# Patient Record
Sex: MALE | Race: WHITE | Employment: UNEMPLOYED | URBAN - NONMETROPOLITAN AREA
[De-identification: names, ages, dates, MRNs, and addresses within clinical notes are randomized per-mention and may not be internally consistent; named-entity substitution may affect disease eponyms.]

---

## 2018-04-30 ENCOUNTER — HOSPITAL ENCOUNTER (OUTPATIENT)
Dept: GENERAL RADIOLOGY | Age: 53
Discharge: HOME | End: 2018-04-30
Payer: COMMERCIAL

## 2018-04-30 ENCOUNTER — HOSPITAL ENCOUNTER (OUTPATIENT)
Age: 53
Discharge: HOME | End: 2018-04-30
Payer: COMMERCIAL

## 2018-04-30 DIAGNOSIS — R52 PAIN: ICD-10-CM

## 2018-04-30 PROCEDURE — 73140 X-RAY EXAM OF FINGER(S): CPT

## 2019-02-01 ENCOUNTER — HOSPITAL ENCOUNTER (OUTPATIENT)
Dept: MRI IMAGING | Age: 54
Discharge: HOME | End: 2019-02-01
Payer: COMMERCIAL

## 2019-02-01 DIAGNOSIS — M25.562 ACUTE PAIN OF LEFT KNEE: ICD-10-CM

## 2019-02-01 PROCEDURE — 73721 MRI JNT OF LWR EXTRE W/O DYE: CPT

## 2019-02-05 ENCOUNTER — HOSPITAL ENCOUNTER (OUTPATIENT)
Dept: GENERAL RADIOLOGY | Age: 54
Discharge: HOME | End: 2019-02-05
Payer: COMMERCIAL

## 2019-02-05 ENCOUNTER — HOSPITAL ENCOUNTER (OUTPATIENT)
Age: 54
Discharge: HOME | End: 2019-02-05
Payer: COMMERCIAL

## 2019-02-05 DIAGNOSIS — Z01.818 PREOP EXAMINATION: ICD-10-CM

## 2019-02-05 DIAGNOSIS — M25.562 LEFT KNEE PAIN, UNSPECIFIED CHRONICITY: ICD-10-CM

## 2019-02-05 LAB
ANION GAP SERPL CALCULATED.3IONS-SCNC: 14 MEQ/L (ref 8–16)
BUN BLDV-MCNC: 19 MG/DL (ref 7–22)
CALCIUM SERPL-MCNC: 9.6 MG/DL (ref 8.5–10.5)
CHLORIDE BLD-SCNC: 101 MEQ/L (ref 98–111)
CO2: 23 MEQ/L (ref 23–33)
CREAT SERPL-MCNC: 1.1 MG/DL (ref 0.4–1.2)
EKG ATRIAL RATE: 63 BPM
EKG P AXIS: 28 DEGREES
EKG P-R INTERVAL: 178 MS
EKG Q-T INTERVAL: 424 MS
EKG QRS DURATION: 102 MS
EKG QTC CALCULATION (BAZETT): 433 MS
EKG R AXIS: -12 DEGREES
EKG T AXIS: 37 DEGREES
EKG VENTRICULAR RATE: 63 BPM
ERYTHROCYTE [DISTWIDTH] IN BLOOD BY AUTOMATED COUNT: 13.8 % (ref 11.5–14.5)
ERYTHROCYTE [DISTWIDTH] IN BLOOD BY AUTOMATED COUNT: 47 FL (ref 35–45)
GFR SERPL CREATININE-BSD FRML MDRD: 70 ML/MIN/1.73M2
GLUCOSE BLD-MCNC: 96 MG/DL (ref 70–108)
HCT VFR BLD CALC: 42.8 % (ref 42–52)
HEMOGLOBIN: 13.9 GM/DL (ref 14–18)
MCH RBC QN AUTO: 30.4 PG (ref 26–33)
MCHC RBC AUTO-ENTMCNC: 32.5 GM/DL (ref 32.2–35.5)
MCV RBC AUTO: 93.7 FL (ref 80–94)
PLATELET # BLD: 242 THOU/MM3 (ref 130–400)
PMV BLD AUTO: 10.3 FL (ref 9.4–12.4)
POTASSIUM SERPL-SCNC: 4.3 MEQ/L (ref 3.5–5.2)
RBC # BLD: 4.57 MILL/MM3 (ref 4.7–6.1)
SODIUM BLD-SCNC: 138 MEQ/L (ref 135–145)
WBC # BLD: 7.2 THOU/MM3 (ref 4.8–10.8)

## 2019-02-05 PROCEDURE — 36415 COLL VENOUS BLD VENIPUNCTURE: CPT

## 2019-02-05 PROCEDURE — 93005 ELECTROCARDIOGRAM TRACING: CPT | Performed by: ORTHOPAEDIC SURGERY

## 2019-02-05 PROCEDURE — 85027 COMPLETE CBC AUTOMATED: CPT

## 2019-02-05 PROCEDURE — 80048 BASIC METABOLIC PNL TOTAL CA: CPT

## 2019-02-05 PROCEDURE — 71046 X-RAY EXAM CHEST 2 VIEWS: CPT

## 2019-02-06 PROCEDURE — 93010 ELECTROCARDIOGRAM REPORT: CPT | Performed by: INTERNAL MEDICINE

## 2019-02-15 RX ORDER — EZETIMIBE 10 MG/1
10 TABLET ORAL EVERY MORNING
COMMUNITY
End: 2021-10-06 | Stop reason: SDUPTHER

## 2019-02-15 RX ORDER — LISINOPRIL 10 MG/1
10 TABLET ORAL EVERY MORNING
COMMUNITY
End: 2021-10-06 | Stop reason: SDUPTHER

## 2019-02-20 ENCOUNTER — ANESTHESIA EVENT (OUTPATIENT)
Dept: OPERATING ROOM | Age: 54
End: 2019-02-20
Payer: COMMERCIAL

## 2019-02-21 ENCOUNTER — HOSPITAL ENCOUNTER (OUTPATIENT)
Age: 54
Setting detail: OUTPATIENT SURGERY
Discharge: HOME | End: 2019-02-21
Attending: ORTHOPAEDIC SURGERY | Admitting: ORTHOPAEDIC SURGERY
Payer: COMMERCIAL

## 2019-02-21 ENCOUNTER — ANESTHESIA (OUTPATIENT)
Dept: OPERATING ROOM | Age: 54
End: 2019-02-21
Payer: COMMERCIAL

## 2019-02-21 VITALS
TEMPERATURE: 98.6 F | DIASTOLIC BLOOD PRESSURE: 57 MMHG | SYSTOLIC BLOOD PRESSURE: 112 MMHG | RESPIRATION RATE: 6 BRPM | OXYGEN SATURATION: 93 %

## 2019-02-21 VITALS
HEART RATE: 53 BPM | SYSTOLIC BLOOD PRESSURE: 121 MMHG | RESPIRATION RATE: 18 BRPM | BODY MASS INDEX: 40.43 KG/M2 | DIASTOLIC BLOOD PRESSURE: 60 MMHG | OXYGEN SATURATION: 95 % | WEIGHT: 315 LBS | HEIGHT: 74 IN | TEMPERATURE: 97 F

## 2019-02-21 DIAGNOSIS — Z98.890 STATUS POST INCISION AND DRAINAGE: Primary | ICD-10-CM

## 2019-02-21 DIAGNOSIS — S83.232A COMPLEX TEAR OF MEDIAL MENISCUS OF LEFT KNEE AS CURRENT INJURY, INITIAL ENCOUNTER: ICD-10-CM

## 2019-02-21 PROCEDURE — 2580000003 HC RX 258: Performed by: NURSE ANESTHETIST, CERTIFIED REGISTERED

## 2019-02-21 PROCEDURE — 2500000003 HC RX 250 WO HCPCS: Performed by: NURSE ANESTHETIST, CERTIFIED REGISTERED

## 2019-02-21 PROCEDURE — 6360000002 HC RX W HCPCS: Performed by: NURSE ANESTHETIST, CERTIFIED REGISTERED

## 2019-02-21 PROCEDURE — 3700000001 HC ADD 15 MINUTES (ANESTHESIA): Performed by: ORTHOPAEDIC SURGERY

## 2019-02-21 PROCEDURE — 6360000002 HC RX W HCPCS: Performed by: ORTHOPAEDIC SURGERY

## 2019-02-21 PROCEDURE — 3600000013 HC SURGERY LEVEL 3 ADDTL 15MIN: Performed by: ORTHOPAEDIC SURGERY

## 2019-02-21 PROCEDURE — 7100000010 HC PHASE II RECOVERY - FIRST 15 MIN: Performed by: ORTHOPAEDIC SURGERY

## 2019-02-21 PROCEDURE — 2580000003 HC RX 258: Performed by: ORTHOPAEDIC SURGERY

## 2019-02-21 PROCEDURE — 7100000011 HC PHASE II RECOVERY - ADDTL 15 MIN: Performed by: ORTHOPAEDIC SURGERY

## 2019-02-21 PROCEDURE — 6370000000 HC RX 637 (ALT 250 FOR IP): Performed by: NURSE PRACTITIONER

## 2019-02-21 PROCEDURE — 3600000003 HC SURGERY LEVEL 3 BASE: Performed by: ORTHOPAEDIC SURGERY

## 2019-02-21 PROCEDURE — 7100000001 HC PACU RECOVERY - ADDTL 15 MIN: Performed by: ORTHOPAEDIC SURGERY

## 2019-02-21 PROCEDURE — 7100000000 HC PACU RECOVERY - FIRST 15 MIN: Performed by: ORTHOPAEDIC SURGERY

## 2019-02-21 PROCEDURE — 3700000000 HC ANESTHESIA ATTENDED CARE: Performed by: ORTHOPAEDIC SURGERY

## 2019-02-21 PROCEDURE — 2500000003 HC RX 250 WO HCPCS: Performed by: ORTHOPAEDIC SURGERY

## 2019-02-21 PROCEDURE — 2709999900 HC NON-CHARGEABLE SUPPLY: Performed by: ORTHOPAEDIC SURGERY

## 2019-02-21 RX ORDER — MORPHINE SULFATE 2 MG/ML
4 INJECTION, SOLUTION INTRAMUSCULAR; INTRAVENOUS
Status: DISCONTINUED | OUTPATIENT
Start: 2019-02-21 | End: 2019-02-21 | Stop reason: HOSPADM

## 2019-02-21 RX ORDER — LABETALOL HYDROCHLORIDE 5 MG/ML
5 INJECTION, SOLUTION INTRAVENOUS EVERY 10 MIN PRN
Status: DISCONTINUED | OUTPATIENT
Start: 2019-02-21 | End: 2019-02-21 | Stop reason: HOSPADM

## 2019-02-21 RX ORDER — SODIUM CHLORIDE 9 MG/ML
INJECTION, SOLUTION INTRAVENOUS CONTINUOUS
Status: DISCONTINUED | OUTPATIENT
Start: 2019-02-21 | End: 2019-02-21 | Stop reason: HOSPADM

## 2019-02-21 RX ORDER — LIDOCAINE HYDROCHLORIDE 20 MG/ML
INJECTION, SOLUTION INFILTRATION; PERINEURAL PRN
Status: DISCONTINUED | OUTPATIENT
Start: 2019-02-21 | End: 2019-02-21 | Stop reason: SDUPTHER

## 2019-02-21 RX ORDER — SODIUM CHLORIDE 0.9 % (FLUSH) 0.9 %
10 SYRINGE (ML) INJECTION PRN
Status: DISCONTINUED | OUTPATIENT
Start: 2019-02-21 | End: 2019-02-21 | Stop reason: HOSPADM

## 2019-02-21 RX ORDER — ONDANSETRON 2 MG/ML
4 INJECTION INTRAMUSCULAR; INTRAVENOUS
Status: DISCONTINUED | OUTPATIENT
Start: 2019-02-21 | End: 2019-02-21 | Stop reason: HOSPADM

## 2019-02-21 RX ORDER — HYDROCODONE BITARTRATE AND ACETAMINOPHEN 5; 325 MG/1; MG/1
2 TABLET ORAL EVERY 4 HOURS PRN
Status: DISCONTINUED | OUTPATIENT
Start: 2019-02-21 | End: 2019-02-21 | Stop reason: HOSPADM

## 2019-02-21 RX ORDER — ONDANSETRON 2 MG/ML
4 INJECTION INTRAMUSCULAR; INTRAVENOUS EVERY 6 HOURS PRN
Status: DISCONTINUED | OUTPATIENT
Start: 2019-02-21 | End: 2019-02-21 | Stop reason: HOSPADM

## 2019-02-21 RX ORDER — MEPERIDINE HYDROCHLORIDE 25 MG/ML
12.5 INJECTION INTRAMUSCULAR; INTRAVENOUS; SUBCUTANEOUS EVERY 5 MIN PRN
Status: DISCONTINUED | OUTPATIENT
Start: 2019-02-21 | End: 2019-02-21 | Stop reason: HOSPADM

## 2019-02-21 RX ORDER — HYDROCODONE BITARTRATE AND ACETAMINOPHEN 5; 325 MG/1; MG/1
1-2 TABLET ORAL
Qty: 40 TABLET | Refills: 0 | Status: SHIPPED | OUTPATIENT
Start: 2019-02-21 | End: 2019-02-28

## 2019-02-21 RX ORDER — HYDROCODONE BITARTRATE AND ACETAMINOPHEN 5; 325 MG/1; MG/1
1 TABLET ORAL EVERY 4 HOURS PRN
Status: DISCONTINUED | OUTPATIENT
Start: 2019-02-21 | End: 2019-02-21 | Stop reason: HOSPADM

## 2019-02-21 RX ORDER — BUPIVACAINE HYDROCHLORIDE AND EPINEPHRINE 5; 5 MG/ML; UG/ML
INJECTION, SOLUTION EPIDURAL; INTRACAUDAL; PERINEURAL PRN
Status: DISCONTINUED | OUTPATIENT
Start: 2019-02-21 | End: 2019-02-21 | Stop reason: ALTCHOICE

## 2019-02-21 RX ORDER — FENTANYL CITRATE 50 UG/ML
50 INJECTION, SOLUTION INTRAMUSCULAR; INTRAVENOUS EVERY 5 MIN PRN
Status: DISCONTINUED | OUTPATIENT
Start: 2019-02-21 | End: 2019-02-21 | Stop reason: HOSPADM

## 2019-02-21 RX ORDER — SODIUM CHLORIDE, SODIUM LACTATE, POTASSIUM CHLORIDE, CALCIUM CHLORIDE 600; 310; 30; 20 MG/100ML; MG/100ML; MG/100ML; MG/100ML
INJECTION, SOLUTION INTRAVENOUS CONTINUOUS PRN
Status: DISCONTINUED | OUTPATIENT
Start: 2019-02-21 | End: 2019-02-21 | Stop reason: SDUPTHER

## 2019-02-21 RX ORDER — MIDAZOLAM HYDROCHLORIDE 1 MG/ML
INJECTION INTRAMUSCULAR; INTRAVENOUS PRN
Status: DISCONTINUED | OUTPATIENT
Start: 2019-02-21 | End: 2019-02-21 | Stop reason: SDUPTHER

## 2019-02-21 RX ORDER — TRIAMCINOLONE ACETONIDE 40 MG/ML
INJECTION, SUSPENSION INTRA-ARTICULAR; INTRAMUSCULAR PRN
Status: DISCONTINUED | OUTPATIENT
Start: 2019-02-21 | End: 2019-02-21 | Stop reason: ALTCHOICE

## 2019-02-21 RX ORDER — ONDANSETRON 2 MG/ML
INJECTION INTRAMUSCULAR; INTRAVENOUS PRN
Status: DISCONTINUED | OUTPATIENT
Start: 2019-02-21 | End: 2019-02-21 | Stop reason: SDUPTHER

## 2019-02-21 RX ORDER — MORPHINE SULFATE 2 MG/ML
2 INJECTION, SOLUTION INTRAMUSCULAR; INTRAVENOUS
Status: DISCONTINUED | OUTPATIENT
Start: 2019-02-21 | End: 2019-02-21 | Stop reason: HOSPADM

## 2019-02-21 RX ORDER — SODIUM CHLORIDE 0.9 % (FLUSH) 0.9 %
10 SYRINGE (ML) INJECTION EVERY 12 HOURS SCHEDULED
Status: DISCONTINUED | OUTPATIENT
Start: 2019-02-21 | End: 2019-02-21 | Stop reason: HOSPADM

## 2019-02-21 RX ORDER — FENTANYL CITRATE 50 UG/ML
INJECTION, SOLUTION INTRAMUSCULAR; INTRAVENOUS PRN
Status: DISCONTINUED | OUTPATIENT
Start: 2019-02-21 | End: 2019-02-21 | Stop reason: SDUPTHER

## 2019-02-21 RX ORDER — DEXAMETHASONE SODIUM PHOSPHATE 4 MG/ML
INJECTION, SOLUTION INTRA-ARTICULAR; INTRALESIONAL; INTRAMUSCULAR; INTRAVENOUS; SOFT TISSUE PRN
Status: DISCONTINUED | OUTPATIENT
Start: 2019-02-21 | End: 2019-02-21 | Stop reason: SDUPTHER

## 2019-02-21 RX ORDER — PROPOFOL 10 MG/ML
INJECTION, EMULSION INTRAVENOUS PRN
Status: DISCONTINUED | OUTPATIENT
Start: 2019-02-21 | End: 2019-02-21 | Stop reason: SDUPTHER

## 2019-02-21 RX ADMIN — FENTANYL CITRATE 25 MCG: 50 INJECTION INTRAMUSCULAR; INTRAVENOUS at 07:33

## 2019-02-21 RX ADMIN — MIDAZOLAM HYDROCHLORIDE 2 MG: 1 INJECTION, SOLUTION INTRAMUSCULAR; INTRAVENOUS at 07:20

## 2019-02-21 RX ADMIN — FENTANYL CITRATE 25 MCG: 50 INJECTION INTRAMUSCULAR; INTRAVENOUS at 07:30

## 2019-02-21 RX ADMIN — ONDANSETRON HYDROCHLORIDE 4 MG: 4 INJECTION, SOLUTION INTRAMUSCULAR; INTRAVENOUS at 07:30

## 2019-02-21 RX ADMIN — LIDOCAINE HYDROCHLORIDE 100 MG: 20 INJECTION, SOLUTION INFILTRATION; PERINEURAL at 07:26

## 2019-02-21 RX ADMIN — HYDROCODONE BITARTRATE AND ACETAMINOPHEN 1 TABLET: 5; 325 TABLET ORAL at 09:05

## 2019-02-21 RX ADMIN — SODIUM CHLORIDE: 9 INJECTION, SOLUTION INTRAVENOUS at 06:16

## 2019-02-21 RX ADMIN — SODIUM CHLORIDE, POTASSIUM CHLORIDE, SODIUM LACTATE AND CALCIUM CHLORIDE: 600; 310; 30; 20 INJECTION, SOLUTION INTRAVENOUS at 07:48

## 2019-02-21 RX ADMIN — PROPOFOL 200 MG: 10 INJECTION, EMULSION INTRAVENOUS at 07:26

## 2019-02-21 RX ADMIN — FENTANYL CITRATE 75 MCG: 50 INJECTION INTRAMUSCULAR; INTRAVENOUS at 07:56

## 2019-02-21 RX ADMIN — FENTANYL CITRATE 25 MCG: 50 INJECTION INTRAMUSCULAR; INTRAVENOUS at 07:38

## 2019-02-21 RX ADMIN — FENTANYL CITRATE 25 MCG: 50 INJECTION INTRAMUSCULAR; INTRAVENOUS at 07:39

## 2019-02-21 RX ADMIN — FENTANYL CITRATE 25 MCG: 50 INJECTION INTRAMUSCULAR; INTRAVENOUS at 07:37

## 2019-02-21 RX ADMIN — DEXAMETHASONE SODIUM PHOSPHATE 10 MG: 4 INJECTION, SOLUTION INTRAMUSCULAR; INTRAVENOUS at 07:30

## 2019-02-21 ASSESSMENT — PULMONARY FUNCTION TESTS
PIF_VALUE: 10
PIF_VALUE: 9
PIF_VALUE: 11
PIF_VALUE: 10
PIF_VALUE: 2
PIF_VALUE: 2
PIF_VALUE: 13
PIF_VALUE: 18
PIF_VALUE: 13
PIF_VALUE: 11
PIF_VALUE: 18
PIF_VALUE: 0
PIF_VALUE: 0
PIF_VALUE: 11
PIF_VALUE: 19
PIF_VALUE: 9
PIF_VALUE: 11
PIF_VALUE: 13
PIF_VALUE: 10
PIF_VALUE: 9
PIF_VALUE: 11
PIF_VALUE: 10
PIF_VALUE: 11
PIF_VALUE: 4
PIF_VALUE: 2
PIF_VALUE: 18
PIF_VALUE: 10
PIF_VALUE: 18
PIF_VALUE: 4
PIF_VALUE: 9
PIF_VALUE: 11

## 2019-02-21 ASSESSMENT — PAIN DESCRIPTION - DESCRIPTORS
DESCRIPTORS: SORE
DESCRIPTORS: DISCOMFORT;DULL
DESCRIPTORS: ACHING

## 2019-02-21 ASSESSMENT — PAIN SCALES - GENERAL
PAINLEVEL_OUTOF10: 6
PAINLEVEL_OUTOF10: 3
PAINLEVEL_OUTOF10: 6
PAINLEVEL_OUTOF10: 0
PAINLEVEL_OUTOF10: 3
PAINLEVEL_OUTOF10: 6
PAINLEVEL_OUTOF10: 6

## 2019-02-21 ASSESSMENT — PAIN DESCRIPTION - LOCATION
LOCATION: KNEE

## 2019-02-21 ASSESSMENT — PAIN DESCRIPTION - ORIENTATION
ORIENTATION: LEFT

## 2019-02-21 ASSESSMENT — PAIN DESCRIPTION - PAIN TYPE
TYPE: SURGICAL PAIN
TYPE: SURGICAL PAIN

## 2019-02-21 ASSESSMENT — PAIN - FUNCTIONAL ASSESSMENT: PAIN_FUNCTIONAL_ASSESSMENT: 0-10

## 2020-06-11 ENCOUNTER — HOSPITAL ENCOUNTER (OUTPATIENT)
Dept: GENERAL RADIOLOGY | Age: 55
Discharge: HOME | End: 2020-06-11
Payer: COMMERCIAL

## 2020-06-11 PROCEDURE — 93226 XTRNL ECG REC<48 HR SCAN A/R: CPT

## 2020-06-11 PROCEDURE — 2709999900 HC NON-CHARGEABLE SUPPLY

## 2020-06-11 PROCEDURE — 93225 XTRNL ECG REC<48 HRS REC: CPT

## 2020-06-18 LAB
ACQUISITION DURATION: NORMAL S
AVERAGE HEART RATE: 73 BPM
HOOKUP DATE: NORMAL
HOOKUP TIME: NORMAL
MAX HEART RATE TIME/DATE: NORMAL
MAX HEART RATE: 110 BPM
MIN HEART RATE TIME/DATE: NORMAL
MIN HEART RATE: 44 BPM
NUMBER OF QRS COMPLEXES: NORMAL
NUMBER OF SUPRAVENTRICULAR BEATS IN RUNS: 0
NUMBER OF SUPRAVENTRICULAR COUPLETS: 0
NUMBER OF SUPRAVENTRICULAR ECTOPICS: 30
NUMBER OF SUPRAVENTRICULAR ISOLATED BEATS: 30
NUMBER OF SUPRAVENTRICULAR RUNS: 0
NUMBER OF VENTRICULAR BEATS IN RUNS: 0
NUMBER OF VENTRICULAR BIGEMINAL CYCLES: 0
NUMBER OF VENTRICULAR COUPLETS: 0
NUMBER OF VENTRICULAR ECTOPICS: 23
NUMBER OF VENTRICULAR ISOLATED BEATS: 23
NUMBER OF VENTRICULAR RUNS: 0

## 2020-06-27 ENCOUNTER — HOSPITAL ENCOUNTER (OUTPATIENT)
Age: 55
Discharge: HOME | End: 2020-06-27
Payer: COMMERCIAL

## 2020-06-27 LAB
ALBUMIN SERPL-MCNC: 4.4 G/DL (ref 3.5–5.1)
ALP BLD-CCNC: 61 U/L (ref 38–126)
ALT SERPL-CCNC: 25 U/L (ref 11–66)
ANION GAP SERPL CALCULATED.3IONS-SCNC: 11 MEQ/L (ref 8–16)
AST SERPL-CCNC: 22 U/L (ref 5–40)
AVERAGE GLUCOSE: 108 MG/DL (ref 70–126)
BASOPHILS # BLD: 0.9 %
BASOPHILS ABSOLUTE: 0 THOU/MM3 (ref 0–0.1)
BILIRUB SERPL-MCNC: 1.8 MG/DL (ref 0.3–1.2)
BILIRUBIN DIRECT: 0.3 MG/DL (ref 0–0.3)
BUN BLDV-MCNC: 15 MG/DL (ref 7–22)
CALCIUM SERPL-MCNC: 9.8 MG/DL (ref 8.5–10.5)
CHLORIDE BLD-SCNC: 104 MEQ/L (ref 98–111)
CHOLESTEROL, FASTING: 160 MG/DL (ref 100–199)
CO2: 23 MEQ/L (ref 23–33)
CREAT SERPL-MCNC: 0.7 MG/DL (ref 0.4–1.2)
EOSINOPHIL # BLD: 2.7 %
EOSINOPHILS ABSOLUTE: 0.1 THOU/MM3 (ref 0–0.4)
ERYTHROCYTE [DISTWIDTH] IN BLOOD BY AUTOMATED COUNT: 13.8 % (ref 11.5–14.5)
ERYTHROCYTE [DISTWIDTH] IN BLOOD BY AUTOMATED COUNT: 47.4 FL (ref 35–45)
GFR SERPL CREATININE-BSD FRML MDRD: > 90 ML/MIN/1.73M2
GLUCOSE FASTING: 100 MG/DL (ref 70–108)
HBA1C MFR BLD: 5.6 % (ref 4.4–6.4)
HCT VFR BLD CALC: 45.2 % (ref 42–52)
HDLC SERPL-MCNC: 42 MG/DL
HEMOGLOBIN: 14.8 GM/DL (ref 14–18)
IMMATURE GRANS (ABS): 0.03 THOU/MM3 (ref 0–0.07)
IMMATURE GRANULOCYTES: 0.5 %
LDL CHOLESTEROL CALCULATED: 81 MG/DL
LYMPHOCYTES # BLD: 32.2 %
LYMPHOCYTES ABSOLUTE: 1.8 THOU/MM3 (ref 1–4.8)
MCH RBC QN AUTO: 30.9 PG (ref 26–33)
MCHC RBC AUTO-ENTMCNC: 32.7 GM/DL (ref 32.2–35.5)
MCV RBC AUTO: 94.4 FL (ref 80–94)
MONOCYTES # BLD: 9.1 %
MONOCYTES ABSOLUTE: 0.5 THOU/MM3 (ref 0.4–1.3)
NUCLEATED RED BLOOD CELLS: 0 /100 WBC
PLATELET # BLD: 193 THOU/MM3 (ref 130–400)
PMV BLD AUTO: 10.2 FL (ref 9.4–12.4)
POTASSIUM SERPL-SCNC: 4.5 MEQ/L (ref 3.5–5.2)
PROSTATE SPECIFIC ANTIGEN: 0.51 NG/ML (ref 0–1)
RBC # BLD: 4.79 MILL/MM3 (ref 4.7–6.1)
SEG NEUTROPHILS: 54.6 %
SEGMENTED NEUTROPHILS ABSOLUTE COUNT: 3 THOU/MM3 (ref 1.8–7.7)
SODIUM BLD-SCNC: 138 MEQ/L (ref 135–145)
TOTAL PROTEIN: 7.1 G/DL (ref 6.1–8)
TRIGLYCERIDE, FASTING: 186 MG/DL (ref 0–199)
TSH SERPL DL<=0.05 MIU/L-ACNC: 1.18 UIU/ML (ref 0.4–4.2)
WBC # BLD: 5.5 THOU/MM3 (ref 4.8–10.8)

## 2020-06-27 PROCEDURE — 80048 BASIC METABOLIC PNL TOTAL CA: CPT

## 2020-06-27 PROCEDURE — 83036 HEMOGLOBIN GLYCOSYLATED A1C: CPT

## 2020-06-27 PROCEDURE — 84443 ASSAY THYROID STIM HORMONE: CPT

## 2020-06-27 PROCEDURE — G0103 PSA SCREENING: HCPCS

## 2020-06-27 PROCEDURE — 36415 COLL VENOUS BLD VENIPUNCTURE: CPT

## 2020-06-27 PROCEDURE — 85025 COMPLETE CBC W/AUTO DIFF WBC: CPT

## 2020-06-27 PROCEDURE — 80076 HEPATIC FUNCTION PANEL: CPT

## 2020-06-27 PROCEDURE — 80061 LIPID PANEL: CPT

## 2020-07-13 ENCOUNTER — TELEPHONE (OUTPATIENT)
Dept: CARDIOLOGY CLINIC | Age: 55
End: 2020-07-13

## 2020-07-13 ENCOUNTER — HOSPITAL ENCOUNTER (OUTPATIENT)
Dept: INTERVENTIONAL RADIOLOGY/VASCULAR | Age: 55
Discharge: HOME | End: 2020-07-13

## 2020-07-13 PROCEDURE — 9900000021 US VASCULAR SCREENING

## 2020-08-12 ENCOUNTER — OFFICE VISIT (OUTPATIENT)
Dept: CARDIOLOGY CLINIC | Age: 55
End: 2020-08-12
Payer: COMMERCIAL

## 2020-08-12 VITALS
HEART RATE: 72 BPM | SYSTOLIC BLOOD PRESSURE: 139 MMHG | DIASTOLIC BLOOD PRESSURE: 86 MMHG | BODY MASS INDEX: 41.75 KG/M2 | WEIGHT: 315 LBS | HEIGHT: 73 IN

## 2020-08-12 PROCEDURE — 93000 ELECTROCARDIOGRAM COMPLETE: CPT | Performed by: INTERNAL MEDICINE

## 2020-08-12 PROCEDURE — 99204 OFFICE O/P NEW MOD 45 MIN: CPT | Performed by: INTERNAL MEDICINE

## 2020-08-12 RX ORDER — ETODOLAC 400 MG/1
400 TABLET, FILM COATED ORAL 2 TIMES DAILY
COMMUNITY
End: 2022-01-13 | Stop reason: SDUPTHER

## 2020-08-12 RX ORDER — ATORVASTATIN CALCIUM 10 MG/1
10 TABLET, FILM COATED ORAL DAILY
COMMUNITY
End: 2021-10-06 | Stop reason: SDUPTHER

## 2020-08-12 RX ORDER — ASPIRIN 81 MG/1
81 TABLET ORAL DAILY
Qty: 90 TABLET | Refills: 1 | Status: SHIPPED | OUTPATIENT
Start: 2020-08-12 | End: 2021-02-09

## 2020-08-12 NOTE — PROGRESS NOTES
Pt Denies CP, Dizziness,Palpitations    C/o Numbness and cold noted in left ankle daily. HA's that run down the side of left neck. SOB with exertion, occasional palpitations. He is . EKG done today.

## 2020-08-12 NOTE — PROGRESS NOTES
[facility],[place] [address] [address]03 [facility] [address]  Dept: [phone]  Dept Fax: [phone]  Loc: [phone]    Visit Date: 8/12/2020    Mr. Junior Acosta is a 47 y.o. male  who presented for:  Chief Complaint   Patient presents with    New Patient     Palpitations   WEAVER  Abnormal coronary CT scan  CAD    HPI:   HPI Roby Siemens is a pleasant 47year old male patient who  has a past medical history of Arthritis, Hyperlipidemia, Hypertension, and CARINA on CPAP. The patient works as . He reported \"cold feeling in ankle\", CHRISTIE was unremarkable 7/13/2020. He has family of CAD, father had MI. The patient has been experiencing worsening exertional symptoms. He has WEAVER and SOB that have worsened over the past few months. His wife state that patient gets short of breath after walking few steps. He states that a coronary CT scan done [facility] was abnormal, patient was referred to cardiology for further evaluation. Records about coronary CT scan are not available at this time. Holter 6/2020 revealed NSR. He continues to have recurrent palpitations. Patient denies chest pain, orthopnea, paroxysmal nocturnal dyspnea, dizziness, syncope, weight gain or leg swelling. Current Outpatient Medications:     atorvastatin (LIPITOR) 10 MG tablet, Take 10 mg by mouth daily, Disp: , Rfl:     etodolac (LODINE) 400 MG tablet, Take 400 mg by mouth 2 times daily, Disp: , Rfl:     lisinopril (PRINIVIL;ZESTRIL) 10 MG tablet, Take 10 mg by mouth every morning, Disp: , Rfl:     ezetimibe (ZETIA) 10 MG tablet, Take 10 mg by mouth every morning, Disp: , Rfl:     Past Medical History  Clover Camacho  has a past medical history of Arthritis, Hyperlipidemia, Hypertension, and CARINA on CPAP. Social History  Clover Camacho  reports that he quit smoking about 2 years ago. His smoking use included cigarettes.  He has never used smokeless tobacco. He reports that he does not drink alcohol or use drugs. Family History  Anson Saavedra family history includes Atrial Fibrillation in his sister; Hypertension in his father; Pacemaker in his father. Past Surgical History   Past Surgical History:   Procedure Laterality Date    ARTHROSCOPY / ARTHROTOMY KNEE Left 2/21/2019    LEFT KNEE ARTHROSCOPY WITH PARTIAL MENISCECTOMY performed by Amina Oliva MD at [address] [place] [address] Dr - l4-5    CARPAL TUNNEL RELEASE Right     COLONOSCOPY         Review of Systems   Constitutional: Negative for chills and fever  HENT: Negative for congestion, sinus pressure, sneezing and sore throat. Eyes: Negative for pain, discharge, redness and itching. Respiratory: Negative for apnea, cough  Gastrointestinal: Negative for blood in stool, constipation, diarrhea   Endocrine: Negative for cold intolerance, heat intolerance, polydipsia. Genitourinary: Negative for dysuria, enuresis, flank pain and hematuria. Musculoskeletal: Negative for arthralgias, joint swelling and neck pain. Neurological: Negative for numbness and headaches. Psychiatric/Behavioral: Negative for agitation, confusion, decreased concentration and dysphoric mood. Objective:     BP (!) 140/88   Pulse 72   Ht 6' 1\" (1.854 m)   Wt (!) 379 lb 6.4 oz (172.1 kg)   BMI 50.06 kg/m²     Wt Readings from Last 3 Encounters:   08/12/20 (!) 379 lb 6.4 oz (172.1 kg)   02/21/19 (!) 356 lb (161.5 kg)     BP Readings from Last 3 Encounters:   08/12/20 (!) 140/88   02/21/19 (!) 112/57   02/21/19 121/60       Nursing note and vitals reviewed. Physical Exam   Constitutional: Oriented to person, place, and time. Appears well-developed and well-nourished. ENT: Moist mucous membranes. No bleeding. Tongue is midline. Head: Normocephalic and atraumatic. Eyes: EOM are normal. Pupils are equal, round, and reactive to light. Neck: Normal range of motion. Neck supple. No JVD present.    Cardiovascular: Normal rate, regular rhythm, systolic murmur, no rubs, and intact distal pulses. Pulmonary/Chest: Effort normal and breath sounds normal. No respiratory distress. No wheezes. No rales. Abdominal: Soft. Bowel sounds are normal. No distension. There is no tenderness. Musculoskeletal: Normal range of motion. no edema. Neurological: Alert and oriented to person, place, and time. No cranial nerve deficit. Coordination normal.   Skin: Skin is warm and dry. Psychiatric: Normal mood and affect. No results found for: CKTOTAL, CKMB, CKMBINDEX    Lab Results   Component Value Date    WBC 5.5 06/27/2020    RBC 4.79 06/27/2020    HGB 14.8 06/27/2020    HCT 45.2 06/27/2020    MCV 94.4 06/27/2020    MCH 30.9 06/27/2020    MCHC 32.7 06/27/2020     06/27/2020    MPV 10.2 06/27/2020       Lab Results   Component Value Date     06/27/2020    K 4.5 06/27/2020     06/27/2020    CO2 23 06/27/2020    BUN 15 06/27/2020    LABALBU 4.4 06/27/2020    CREATININE 0.7 06/27/2020    CALCIUM 9.8 06/27/2020    LABGLOM >90 06/27/2020    GLUCOSE 96 02/05/2019       Lab Results   Component Value Date    ALKPHOS 61 06/27/2020    ALT 25 06/27/2020    AST 22 06/27/2020    PROT 7.1 06/27/2020    BILITOT 1.8 06/27/2020    BILIDIR 0.3 06/27/2020    LABALBU 4.4 06/27/2020       No results found for: MG    No results found for: INR, PROTIME      Lab Results   Component Value Date    LABA1C 5.6 06/27/2020       Lab Results   Component Value Date    HDL 42 06/27/2020    LDLCALC 81 06/27/2020       Lab Results   Component Value Date    TSH 1.180 06/27/2020         Testing Reviewed:      I have individually reviewed the cardiac test below:    ECHO: No results found for this or any previous visit.      HOLTER 6/2020  HOLTER MONITOR: 24 Hours      INDICATION FOR STUDY:  PALPITATIONS     CONCLUSION:   Normal sinus rhythm  No other form of arrhythmia noted.       Confirmed by Mary Odonnell ([id number]) on 6/18/2020 7:25:42 AM      Ekg:   EKG Interpretation:  normal EKG, normal sinus rhythm, unchanged from previous tracings. Narrative    PROCEDURE: VASCULAR SCREENING EXAM         CLINICAL INFORMATION: Screening for atherosclerotic vascular disease.         COMPARISON: No prior study.         TECHNIQUE: Limited grayscale and color flow sonographic images of both carotid arteries and abdominal aorta were obtained. Ankle brachial indices were calculated and Doppler waveforms were also obtained.         FINDINGS:         RIGHT ICA: There are no significant atherosclerotic changes. There is no elevation of peak systolic velocities when transitioning into the internal carotid artery. Systolic ratios are within normal limits.         LEFT ICA: There are no significant atherosclerotic changes. There is no elevation of peak systolic velocities when transitioning into the internal carotid artery. Systolic ratios are within normal limits.         AORTA: Unremarkable . No aneurysm is seen. . Aorta diameter 1.8 cm.         Right CHRISTIE:  1.18. (Normal range 0.9-1.3)    Left CHRISTIE:  1.08.    (Normal range 0.9-1.3)         Doppler:  Normal-appearing triphasic Doppler waveforms bilaterally.              Impression    There are no significant findings on this screening vascular ultrasound exam.                   **This report has been created using voice recognition software.  It may contain minor errors which are inherent in voice recognition technology. **         Final report electronically signed by Dr Jayro Schmitz on 7/13/2020 8:49 AM            AssessmentPlan:     Trinity Strong is a pleasant 47year old male patient who  has a past medical history of Arthritis, Hyperlipidemia, Hypertension, and CARINA on CPAP. The patient works as . He reported \"cold feeling in ankle\", CHRISTIE was unremarkable 7/13/2020. He has family of CAD, father had MI. The patient has been experiencing worsening exertional symptoms. He has WEAVER and SOB that have worsened over the past few months.  His wife state that patient gets short of breath after walking few steps. He states that a coronary CT scan done [facility] was abnormal, patient was referred to cardiology for further evaluation. Records about coronary CT scan are not available at this time. Holter 6/2020 revealed NSR. He continues to have recurrent palpitations. Patient denies chest pain, orthopnea, paroxysmal nocturnal dyspnea, dizziness, syncope, weight gain or leg swelling. 1. Worsening dyspnea on exertion, SOB  2. Angina equivalent symptoms, CCS class III  3. CAD, abnormal coronary CT scan   4. Palpitations   5. HTN  6. Dyslipidemia  7. CARINA  8. FH of CAD      The patient wore the holter monitor only for 24 hours   He continues to have palpitations    Symptoms are concerning for possible underlying rhythm problems, EKG was reviewed. Will proceed with a 30 day cardiac event monitor    DDX includes CHD, CAD, SIHD   Will need to investigate for underlying structural heart disease, will proceed with a transthoracic echocardiogram    ASA 81 mg po daily   Obtain records from [facility] about recent coronary CT scan    May need further ischemic evaluation with stress test [facility]   Patient was advised to report to the ER if he has recurrent symptoms with specific instructions given about severity and duration of symptoms      Above findings and plan of care were discussed with patient in details, patient's questions were answered.      Disposition:  RTC in 6 months             Electronically signed by Jacek Henriquez MD, [facility] [place]    8/12/2020 at 2:59 PM EDT

## 2020-08-14 ENCOUNTER — TELEPHONE (OUTPATIENT)
Dept: CARDIOLOGY CLINIC | Age: 55
End: 2020-08-14

## 2020-08-14 NOTE — TELEPHONE ENCOUNTER
Called pt to schedule echo and holter. Pt would like Dr. Alissa Seals to review calcium scoring test.  Pt states if everything is normal-he really does not want echo and holter done. Please advise.

## 2020-08-15 NOTE — TELEPHONE ENCOUNTER
Study is abnormal. Coronay calcium score indicates high risk patient with \"extensive coronary artery plaque burden\". Patient with mulltiple risk factors for CAD, he reported WEAVER and SOB, worrisome ischemic etiology. DDX also includes possible underlying CHF. 1. Need to proceed with Echocardiogram to assess for possible CHF, LV systolic/diastolic function, valvular heart disease. ..  2. Patient needs ischemic work up, order a Lexiscan stress test  3. Holter is needed if he continues to have palpitations, ask patient about persistence of palpitations,heart racing, pounding. ...

## 2020-08-25 ENCOUNTER — HOSPITAL ENCOUNTER (OUTPATIENT)
Dept: NON INVASIVE DIAGNOSTICS | Age: 55
Discharge: HOME | End: 2020-08-25
Payer: COMMERCIAL

## 2020-08-25 LAB
LV EF: 55 %
LVEF MODALITY: NORMAL

## 2020-08-25 PROCEDURE — 78452 HT MUSCLE IMAGE SPECT MULT: CPT

## 2020-08-25 PROCEDURE — 3430000000 HC RX DIAGNOSTIC RADIOPHARMACEUTICAL: Performed by: INTERNAL MEDICINE

## 2020-08-25 PROCEDURE — 93306 TTE W/DOPPLER COMPLETE: CPT

## 2020-08-25 PROCEDURE — 6360000002 HC RX W HCPCS

## 2020-08-25 PROCEDURE — 93017 CV STRESS TEST TRACING ONLY: CPT

## 2020-08-25 PROCEDURE — A9500 TC99M SESTAMIBI: HCPCS | Performed by: INTERNAL MEDICINE

## 2020-08-25 RX ADMIN — Medication 33 MILLICURIE: at 12:05

## 2020-08-25 RX ADMIN — Medication 9.7 MILLICURIE: at 11:10

## 2020-09-08 NOTE — TELEPHONE ENCOUNTER
Pt is asking about the results of stress and echo. Anything new needed? Summary   Technically difficult examination. Mild concentric left ventricular hypertrophy. Left ventricle size is normal.   There were no regional wall motion abnormalities. Ejection fraction is visually estimated at 55%. Mildly dilated left atrium. Mildly dilated right ventricle. Right ventricle global systolic function is mildly reduced . Dilated IVC with poor inspiration collapse consistent with elevated right   atrial pressure.

## 2021-02-09 RX ORDER — ASPIRIN 81 MG/1
TABLET ORAL
Qty: 90 TABLET | Refills: 0 | Status: SHIPPED | OUTPATIENT
Start: 2021-02-09 | End: 2021-04-19

## 2021-02-10 ENCOUNTER — OFFICE VISIT (OUTPATIENT)
Dept: CARDIOLOGY CLINIC | Age: 56
End: 2021-02-10
Payer: COMMERCIAL

## 2021-02-10 VITALS
DIASTOLIC BLOOD PRESSURE: 60 MMHG | BODY MASS INDEX: 40.43 KG/M2 | WEIGHT: 315 LBS | SYSTOLIC BLOOD PRESSURE: 130 MMHG | HEART RATE: 74 BPM | HEIGHT: 74 IN

## 2021-02-10 DIAGNOSIS — E78.5 DYSLIPIDEMIA: Primary | ICD-10-CM

## 2021-02-10 PROCEDURE — 99214 OFFICE O/P EST MOD 30 MIN: CPT | Performed by: INTERNAL MEDICINE

## 2021-02-10 NOTE — PROGRESS NOTES
[address] [address] 903 [facility] [address]  Dept: [phone]  Dept Fax: [phone]  Loc: [phone]    Visit Date: 2/10/2021    Mr. Elizabeth Mike is a 54 y.o. male  who presented for:    CAD  Dyslipidemia      HPI:   HPI   Fran Tristan is a pleasant 54year old male patient who  has a past medical history of Arthritis, Hyperlipidemia, Hypertension, and CARINA on CPAP. The patient was previously referred to cardiology for WEAVER, exertional symptoms and an abnormal coronary calcium score at 458 (2020). Echo 08/2020 revealed an EF 55%, LVH, LAE, Mildly dilated RV, Dilated IVC. Stress test 08/2020 was negative for ischemia. He reports that he has lost ~ 30 Ib. The patient reports significant improvement in his symptoms since his weight loss. Patient denies chest pain, shortness of breath, dyspnea on exertion, orthopnea, paroxysmal nocturnal dyspnea, palpitations, dizziness, syncope, weight gain or leg swelling. Home BP readings are ok per patient. Today BP high 149/85. Current Outpatient Medications:     aspirin 81 MG EC tablet, TAKE 1 TABLET DAILY, Disp: 90 tablet, Rfl: 0    atorvastatin (LIPITOR) 10 MG tablet, Take 10 mg by mouth daily, Disp: , Rfl:     etodolac (LODINE) 400 MG tablet, Take 400 mg by mouth 2 times daily, Disp: , Rfl:     lisinopril (PRINIVIL;ZESTRIL) 10 MG tablet, Take 10 mg by mouth every morning, Disp: , Rfl:     ezetimibe (ZETIA) 10 MG tablet, Take 10 mg by mouth every morning, Disp: , Rfl:     Past Medical History  Dora Melgoza  has a past medical history of Arthritis, Hyperlipidemia, Hypertension, and CARINA on CPAP. Social History  Dora Melgoza  reports that he quit smoking about 2 years ago. His smoking use included cigarettes. He has never used smokeless tobacco. He reports that he does not drink alcohol or use drugs. Family History  Dora Melgoza family history includes Atrial Fibrillation in his sister;  Hypertension in his father; Pacemaker in his father. Past Surgical History   Past Surgical History:   Procedure Laterality Date    ARTHROSCOPY / ARTHROTOMY KNEE Left 2/21/2019    LEFT KNEE ARTHROSCOPY WITH PARTIAL MENISCECTOMY performed by Cheo Augustine MD at [address] [place] [address] Dr - l4-5    CARPAL TUNNEL RELEASE Right     COLONOSCOPY         Review of Systems   Constitutional: Negative for chills and fever  HENT: Negative for congestion, sinus pressure, sneezing and sore throat. Eyes: Negative for pain, discharge, redness and itching. Respiratory: Negative for apnea, cough  Gastrointestinal: Negative for blood in stool, constipation, diarrhea   Endocrine: Negative for cold intolerance, heat intolerance, polydipsia. Genitourinary: Negative for dysuria, enuresis, flank pain and hematuria. Musculoskeletal: Negative for arthralgias, joint swelling and neck pain. Neurological: Negative for numbness and headaches. Psychiatric/Behavioral: Negative for agitation, confusion, decreased concentration and dysphoric mood. Objective: There were no vitals taken for this visit. Wt Readings from Last 3 Encounters:   08/12/20 (!) 379 lb 6.4 oz (172.1 kg)   02/21/19 (!) 356 lb (161.5 kg)     BP Readings from Last 3 Encounters:   08/12/20 139/86   02/21/19 (!) 112/57   02/21/19 121/60       Nursing note and vitals reviewed. Physical Exam   Constitutional: Oriented to person, place, and time. Appears well-developed and well-nourished. ENT: Moist mucous membranes. No bleeding. Tongue is midline. Head: Normocephalic and atraumatic. Eyes: EOM are normal. Pupils are equal, round, and reactive to light. Neck: Normal range of motion. Neck supple. No JVD present. Cardiovascular: Normal rate, regular rhythm, no murmur, no rubs, and intact distal pulses. Pulmonary/Chest: Effort normal and breath sounds normal. No respiratory distress. No wheezes. No rales. Abdominal: Soft.  Bowel sounds are normal. No distension. There is no tenderness. Musculoskeletal: Normal range of motion. no edema. Neurological: Alert and oriented to person, place, and time. No cranial nerve deficit. Coordination normal.   Skin: Skin is warm and dry. Psychiatric: Normal mood and affect.        No results found for: CKTOTAL, CKMB, CKMBINDEX    Lab Results   Component Value Date    WBC 5.5 06/27/2020    RBC 4.79 06/27/2020    HGB 14.8 06/27/2020    HCT 45.2 06/27/2020    MCV 94.4 06/27/2020    MCH 30.9 06/27/2020    MCHC 32.7 06/27/2020     06/27/2020    MPV 10.2 06/27/2020       Lab Results   Component Value Date     06/27/2020    K 4.5 06/27/2020     06/27/2020    CO2 23 06/27/2020    BUN 15 06/27/2020    LABALBU 4.4 06/27/2020    CREATININE 0.7 06/27/2020    CALCIUM 9.8 06/27/2020    LABGLOM >90 06/27/2020    GLUCOSE 96 02/05/2019       Lab Results   Component Value Date    ALKPHOS 61 06/27/2020    ALT 25 06/27/2020    AST 22 06/27/2020    PROT 7.1 06/27/2020    BILITOT 1.8 06/27/2020    BILIDIR 0.3 06/27/2020    LABALBU 4.4 06/27/2020       No results found for: MG    No results found for: INR, PROTIME      Lab Results   Component Value Date    LABA1C 5.6 06/27/2020       Lab Results   Component Value Date    HDL 42 06/27/2020    LDLCALC 81 06/27/2020       Lab Results   Component Value Date    TSH 1.180 06/27/2020         Testing Reviewed:      I have individually reviewed the cardiac test below:    ECHO:   Results for orders placed during the hospital encounter of 08/25/20   Echo 2D w doppler w color complete    Narrative Transthoracic Echocardiography Report (TTE)     Demographics      Patient Name    Nikolay Zapata Gender               Male      MR #            [id number]     Race                                                     Ethnicity      Account #       [de-identified]     Room Number      Accession       [id number]     Date of Study        08/25/2020   Number      Date of Birth   1965 Referring Physician  Bradley Tilley MD                                                      Samie Aschoff, CNP      Age             47 year(s)    Carol Gan MD                                 Physician     Procedure    Type of Study      TTE procedure:ECHOCARDIOGRAM COMPLETE 2D W DOPPLER W COLOR. Procedure Date  Date: 08/25/2020 Start: 10:16 AM    Study Location: Echo Lab  Technical Quality: Limited visualization due to body habitus. Indications:Dyspnea on exertion. Additional Medical History:Coronary artery disease, CARINA, Hypertension,  Hyperlipidemia, Arthritis, Palpitations, Family history of heart disease,  Former smoker    Patient Status: Routine    Height: 72.83 inches Weight: 379.01 pounds BSA: 2.82 m^2 BMI: 50.23 kg/m^2    BP: 139/86 mmHg     Conclusions      Summary   Technically difficult examination. Mild concentric left ventricular hypertrophy. Left ventricle size is normal.   There were no regional wall motion abnormalities. Ejection fraction is visually estimated at 55%. Mildly dilated left atrium. Mildly dilated right ventricle. Right ventricle global systolic function is mildly reduced . Dilated IVC with poor inspiration collapse consistent with elevated right   atrial pressure. Signature      ----------------------------------------------------------------   Electronically signed by Bradley Tilley MD (Interpreting   physician) on 08/25/2020 at 06:16 PM   ----------------------------------------------------------------      Findings      Mitral Valve   The mitral valve structure was normal with normal leaflet separation. DOPPLER: The transmitral velocity was within the normal range with no   evidence for mitral stenosis. There was no evidence of mitral   regurgitation.       Aortic Valve   The aortic valve was abdominal aorta were obtained. Ankle brachial indices were calculated and Doppler waveforms were also obtained.         FINDINGS:         RIGHT ICA: There are no significant atherosclerotic changes. There is no elevation of peak systolic velocities when transitioning into the internal carotid artery. Systolic ratios are within normal limits.         LEFT ICA: There are no significant atherosclerotic changes. There is no elevation of peak systolic velocities when transitioning into the internal carotid artery. Systolic ratios are within normal limits.         AORTA: Unremarkable . No aneurysm is seen. . Aorta diameter 1.8 cm.         Right CHRISTIE:  1.18. (Normal range 0.9-1.3)    Left CHRISTIE:  1.08.    (Normal range 0.9-1.3)         Doppler:  Normal-appearing triphasic Doppler waveforms bilaterally.              Impression    There are no significant findings on this screening vascular ultrasound exam.                   **This report has been created using voice recognition software.  It may contain minor errors which are inherent in voice recognition technology. **         Final report electronically signed by Dr Laz Davies on 7/13/2020 8:49 AM         AssessmentPlan:   Tammy Moore is a pleasant 54year old male patient who  has a past medical history of Arthritis, Hyperlipidemia, Hypertension, and CARINA on CPAP. The patient was previously referred to cardiology for WEAVER, exertional symptoms and an abnormal coronary calcium score at 458 (2020). Echo 08/2020 revealed an EF 55%, LVH, LAE, Mildly dilated RV, Dilated IVC. Stress test 08/2020 was negative for ischemia. He reports that he has lost ~ 30 Ib. The patient reports significant improvement in his symptoms since his weight loss. Patient denies chest pain, shortness of breath, dyspnea on exertion, orthopnea, paroxysmal nocturnal dyspnea, palpitations, dizziness, syncope, weight gain or leg swelling. Home BP readings are ok per patient. Today BP high 149/85. 1. CAD, abnormal coronary CT scan   2. LVH, ? RV dilation   3. LAE  4. HTN  5. Dyslipidemia  6. CARINA  7. FH of CAD      · CORONARY CALCIUM SCORE 458  · Patient underwent stress test, no ischemic noted  · He reports no chest pain  · WEAVER has resolved per patient   · He has lost weight since prior evaluation  · The patient is advised to begin progressive daily aerobic exercise program and attempt to lose weight. · ASA 81 mg po daily  · Lipitor  · Hyperlipidemia: on statins, followed periodically. Patient need periodic lipid and liver profile      Above findings and plan of care were discussed with patient in details, patient's questions were answered.      Disposition:  RTC in 12 months             Electronically signed by Moraima Novak MD, [facility] [place], [place]    2/10/2021 at 3:22 PM EST

## 2021-02-10 NOTE — PROGRESS NOTES
New Pt. Here for SOB    Pt denies: SALINA, Lightheadedness/Dizziness, Fluttering, Cp.      PT c/o: sob that has gotten better, coldness in the left foot isn't as bad,

## 2021-03-24 LAB
ALBUMIN SERPL-MCNC: 4.2 G/DL (ref 3.2–5.3)
ALK PHOSPHATASE: 55 U/L (ref 39–130)
ALT SERPL-CCNC: 53 U/L (ref 0–40)
AST SERPL-CCNC: 23 U/L (ref 0–41)
BILIRUB SERPL-MCNC: 1.3 MG/DL (ref 0.3–1.2)
BILIRUBIN DIRECT: 0.2 MG/DL (ref 0–0.4)
CHOLESTEROL/HDL RATIO: 2.9 (ref 1–5)
CHOLESTEROL: 159 MG/DL (ref 150–200)
HDLC SERPL-MCNC: 55 MG/DL
LDL CHOLESTEROL CALCULATED: 81 MG/DL
LDL/HDL RATIO: 1.5
TOTAL PROTEIN: 6.8 G/DL (ref 6–8)
TRIGL SERPL-MCNC: 117 MG/DL (ref 27–150)
VLDLC SERPL CALC-MCNC: 23 MG/DL (ref 0–30)

## 2021-04-19 RX ORDER — ASPIRIN 81 MG/1
TABLET, DELAYED RELEASE ORAL
Qty: 90 TABLET | Refills: 3 | Status: SHIPPED | OUTPATIENT
Start: 2021-04-19 | End: 2022-01-13 | Stop reason: SDUPTHER

## 2022-01-13 PROBLEM — E78.5 HYPERLIPIDEMIA: Status: ACTIVE | Noted: 2022-01-13

## 2022-01-13 PROBLEM — R79.89 ELEVATED LIVER FUNCTION TESTS: Status: ACTIVE | Noted: 2022-01-13

## 2022-01-13 PROBLEM — G47.30 SLEEP APNEA WITH USE OF CONTINUOUS POSITIVE AIRWAY PRESSURE (CPAP): Status: ACTIVE | Noted: 2022-01-13

## 2022-01-13 PROBLEM — F41.9 ANXIETY: Status: ACTIVE | Noted: 2022-01-13

## 2022-01-25 ENCOUNTER — HOSPITAL ENCOUNTER (EMERGENCY)
Age: 57
Discharge: HOME | End: 2022-01-25
Payer: COMMERCIAL

## 2022-01-25 ENCOUNTER — APPOINTMENT (OUTPATIENT)
Dept: CT IMAGING | Age: 57
End: 2022-01-25
Payer: COMMERCIAL

## 2022-01-25 VITALS
SYSTOLIC BLOOD PRESSURE: 128 MMHG | TEMPERATURE: 98.8 F | OXYGEN SATURATION: 96 % | DIASTOLIC BLOOD PRESSURE: 73 MMHG | HEART RATE: 84 BPM | RESPIRATION RATE: 16 BRPM

## 2022-01-25 DIAGNOSIS — S22.018A OTHER CLOSED FRACTURE OF FIRST THORACIC VERTEBRA, INITIAL ENCOUNTER (HCC): ICD-10-CM
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
## 2022-01-25 PROCEDURE — 99213 OFFICE O/P EST LOW 20 MIN: CPT

## 2022-01-25 PROCEDURE — 72128 CT CHEST SPINE W/O DYE: CPT

## 2022-01-25 PROCEDURE — 72125 CT NECK SPINE W/O DYE: CPT

## 2022-01-25 PROCEDURE — 72131 CT LUMBAR SPINE W/O DYE: CPT

## 2022-01-25 PROCEDURE — 99204 OFFICE O/P NEW MOD 45 MIN: CPT | Performed by: NURSE PRACTITIONER

## 2022-01-25 RX ORDER — IBUPROFEN 600 MG/1
600 TABLET ORAL EVERY 6 HOURS PRN
COMMUNITY
End: 2022-04-04

## 2022-01-25 ASSESSMENT — PAIN DESCRIPTION - ONSET
ONSET: GRADUAL
ONSET_3: GRADUAL
ONSET_2: GRADUAL

## 2022-01-25 ASSESSMENT — PAIN DESCRIPTION - INTENSITY
RATING_3: 5
RATING_2: 7

## 2022-01-25 ASSESSMENT — PAIN DESCRIPTION - DESCRIPTORS
DESCRIPTORS: ACHING
DESCRIPTORS_2: SHARP
DESCRIPTORS_3: ACHING

## 2022-01-25 ASSESSMENT — PAIN DESCRIPTION - FREQUENCY: FREQUENCY: INTERMITTENT

## 2022-01-25 ASSESSMENT — ENCOUNTER SYMPTOMS
DIARRHEA: 0
COUGH: 0
NAUSEA: 0
VOMITING: 0
RHINORRHEA: 0
SHORTNESS OF BREATH: 0
CHEST TIGHTNESS: 0
SORE THROAT: 0

## 2022-01-25 ASSESSMENT — PAIN DESCRIPTION - DURATION
DURATION_2: INTERMITTENT
DURATION_3: INTERMITTENT

## 2022-01-25 ASSESSMENT — PAIN DESCRIPTION - ORIENTATION
ORIENTATION: LEFT
ORIENTATION_3: RIGHT;MID;LOWER
ORIENTATION_2: RIGHT

## 2022-01-25 ASSESSMENT — PAIN DESCRIPTION - LOCATION
LOCATION: KNEE
LOCATION_2: NECK
LOCATION_3: BACK

## 2022-01-25 ASSESSMENT — PAIN DESCRIPTION - PROGRESSION
CLINICAL_PROGRESSION: GRADUALLY WORSENING
CLINICAL_PROGRESSION_3: GRADUALLY WORSENING
CLINICAL_PROGRESSION_2: GRADUALLY WORSENING

## 2022-01-25 ASSESSMENT — PAIN DESCRIPTION - PAIN TYPE
TYPE_3: ACUTE PAIN
TYPE: ACUTE PAIN

## 2022-01-25 ASSESSMENT — PAIN - FUNCTIONAL ASSESSMENT: PAIN_FUNCTIONAL_ASSESSMENT: PREVENTS OR INTERFERES WITH MANY ACTIVE NOT PASSIVE ACTIVITIES

## 2022-01-25 ASSESSMENT — PAIN SCALES - GENERAL: PAINLEVEL_OUTOF10: 6

## 2022-01-25 NOTE — Clinical Note
Manju Horan was seen and treated in our emergency department on 1/25/2022. He may return to work on 01/27/2022. Off work until cleared by neuro     If you have any questions or concerns, please don't hesitate to call.       Lilia Shukla, JANET - CNP

## 2022-01-25 NOTE — ED PROVIDER NOTES
[address]  Urgent Care Encounter       CHIEF COMPLAINT       Chief Complaint   Patient presents with    Fall     fell off back of semi onto his back. Pt having neck pain, back pain \"top to bottom\" and left knee pain from tring to get up off of stones. - yesterday 1150       Nurses Notes reviewed and I agree except as noted in the HPI. HISTORY OF PRESENT ILLNESS   Hargis Scheuermann is a 64 y.o. male who presents to the [facility] care center for evaluation of knee and back pain after a fall. He reports yesterday climbing down a ladder on his semi he slipped and fell onto the ground. He reports he was roughly 3 feet off the ground. He does report that he landed directly onto his buttocks/back. He reports cervical, thoracic, and lumbar pain and tenderness. He does report previous lumbar fusion with rods. He also reports bilateral knee pain. He reports that he had to roll and kneel in the stones to get off the ground. No injury or ecchymosis noted. No saddle paresthesia or incontinence of bowel or bladder. The history is provided by the patient. No  was used. REVIEW OF SYSTEMS     Review of Systems   Constitutional: Negative for activity change, appetite change, chills, fatigue and fever. HENT: Negative for ear discharge, ear pain, rhinorrhea and sore throat. Respiratory: Negative for cough, chest tightness and shortness of breath. Cardiovascular: Negative for chest pain. Gastrointestinal: Negative for diarrhea, nausea and vomiting. Genitourinary: Negative for dysuria. Skin: Negative for rash. Allergic/Immunologic: Negative for environmental allergies and food allergies. Neurological: Negative for dizziness and headaches. PAST MEDICAL HISTORY         Diagnosis Date    Arthritis     OLU.  THUMBS    Hyperlipidemia     Hypertension     CARINA on CPAP        SURGICALHISTORY     Patient  has a past surgical history that includes back surgery; Carpal tunnel release (Right); Colonoscopy; and ARTHROSCOPY / ARTHROTOMY KNEE (Left, 2/21/2019). CURRENT MEDICATIONS       Discharge Medication List as of 1/25/2022  8:18 PM      CONTINUE these medications which have NOT CHANGED    Details   ibuprofen (ADVIL;MOTRIN) 600 MG tablet Take 600 mg by mouth every 6 hours as needed for PainHistorical Med      atorvastatin (LIPITOR) 10 MG tablet Take 1 tablet by mouth nightly, Disp-90 tablet, R-3Normal      escitalopram (LEXAPRO) 20 MG tablet Take 1 tablet by mouth daily, Disp-90 tablet, R-3Normal      ezetimibe (ZETIA) 10 MG tablet Take 1 tablet by mouth nightly, Disp-90 tablet, R-3Normal      lisinopril (PRINIVIL;ZESTRIL) 10 MG tablet Take 1 tablet by mouth every morning, Disp-90 tablet, R-3Normal      aspirin (SM ASPIRIN ADULT LOW STRENGTH) 81 MG EC tablet Take 1 tablet by mouth daily, Disp-90 tablet, R-3Normal      etodolac (LODINE) 400 MG tablet Take 1 tablet by mouth 2 times daily, Disp-180 tablet, R-3Normal      sildenafil (VIAGRA) 100 MG tablet Take 1 tablet by mouth as needed for Erectile Dysfunction, Disp-30 tablet, R-3Normal             ALLERGIES     Patient is is allergic to latex and tape [adhesive tape]. Patients   Immunization History   Administered Date(s) Administered    COVID-19, Xi [place], Primary or Immunocompromised, PF, 100mcg/0.5mL 1965, 09/16/2021, 10/15/2021    Influenza, Quadv, IM, PF (6 mo and older Fluzone, Flulaval, Fluarix, and 3 yrs and older Afluria) 11/05/2020    Tdap (Boostrix, Adacel) 01/13/2022       FAMILY HISTORY     Patient's family history includes Atrial Fibrillation in his sister; Hypertension in his father; Pacemaker in his father. SOCIAL HISTORY     Patient  reports that he quit smoking about 3 years ago. His smoking use included cigarettes. He has never used smokeless tobacco. He reports that he does not drink alcohol and does not use drugs.     PHYSICAL EXAM     ED TRIAGE VITALS  BP: 128/73, Temp: 98.8 °F (37.1 °C), Pulse: 84, Resp: 16, SpO2: 96 %,Estimated body mass index is 48.25 kg/m² as calculated from the following:    Height as of 1/13/22: 6' 3\" (1.905 m). Weight as of 1/13/22: 386 lb (175.1 kg). ,No LMP for male patient. Physical Exam  Vitals and nursing note reviewed. Constitutional:       General: He is not in acute distress. Appearance: Normal appearance. He is not ill-appearing, toxic-appearing or diaphoretic. HENT:      Head: Normocephalic. Right Ear: Ear canal and external ear normal.      Left Ear: Ear canal and external ear normal.      Nose: Nose normal. No congestion or rhinorrhea. Mouth/Throat:      Mouth: Mucous membranes are moist.      Pharynx: Oropharynx is clear. No oropharyngeal exudate or posterior oropharyngeal erythema. Cardiovascular:      Rate and Rhythm: Normal rate. Pulses: Normal pulses. Pulmonary:      Effort: Pulmonary effort is normal. No respiratory distress. Breath sounds: No stridor. No wheezing or rhonchi. Abdominal:      General: Abdomen is flat. Bowel sounds are normal.      Palpations: Abdomen is soft. Musculoskeletal:         General: No swelling or tenderness. Normal range of motion. Cervical back: Normal range of motion. Neurological:      General: No focal deficit present. Mental Status: He is alert and oriented to person, place, and time. Psychiatric:         Mood and Affect: Mood normal.         Behavior: Behavior normal.         DIAGNOSTIC RESULTS     Labs:No results found for this visit on 01/25/22. IMAGING:    CT CERVICAL SPINE WO CONTRAST   Final Result   Acute fractures of the spinous process of C7 and T1. This finding is best seen on sagittal images #26 and #27. **This report has been created using voice recognition software. It may contain minor errors which are inherent in voice recognition technology. **      Final report electronically signed by Dr Valentin May on 1/25/2022 7:41 PM health. I did discuss the results with the patient. He is instructed to present to the emergency department for change in severity of pain along with numbness, tingling, or incontinence of bowel or bladder. Patient is agreeable with the above plan and denies questions or concerns at this time.       PATIENT REFERRED TO:  JANET Arndt CNP [address] / [place] [id number]      DISCHARGE MEDICATIONS:  Discharge Medication List as of 1/25/2022  8:18 PM          Discharge Medication List as of 1/25/2022  8:18 PM          Discharge Medication List as of 1/25/2022  8:18 PM          JANET Redmond CNP    (Please note that portions of this note were completed with a voice recognition program. Efforts were made to edit the dictations but occasionally words are mis-transcribed.)           JANET Redmond CNP  01/25/22 2057

## 2022-01-26 ENCOUNTER — OFFICE VISIT (OUTPATIENT)
Dept: NEUROSURGERY | Age: 57
End: 2022-01-26
Payer: COMMERCIAL

## 2022-01-26 ENCOUNTER — TELEPHONE (OUTPATIENT)
Dept: ADMINISTRATIVE | Facility: CLINIC | Age: 57
End: 2022-01-26

## 2022-01-26 VITALS
OXYGEN SATURATION: 95 % | SYSTOLIC BLOOD PRESSURE: 140 MMHG | BODY MASS INDEX: 39.17 KG/M2 | HEIGHT: 75 IN | DIASTOLIC BLOOD PRESSURE: 86 MMHG | HEART RATE: 78 BPM | RESPIRATION RATE: 18 BRPM | WEIGHT: 315 LBS

## 2022-01-26 DIAGNOSIS — S12.600A CLOSED DISPLACED FRACTURE OF SEVENTH CERVICAL VERTEBRA, UNSPECIFIED FRACTURE MORPHOLOGY, INITIAL ENCOUNTER (HCC): Primary | ICD-10-CM

## 2022-01-26 DIAGNOSIS — S32.010A COMPRESSION FRACTURE OF L1 VERTEBRA, INITIAL ENCOUNTER (HCC): ICD-10-CM

## 2022-01-26 PROCEDURE — 99203 OFFICE O/P NEW LOW 30 MIN: CPT | Performed by: PHYSICIAN ASSISTANT

## 2022-01-26 NOTE — ED NOTES
No change in patients condition. Pt placed in adult cervical collar as asked per Carlos A Burgess from the [place] office, after [place], [facility] spoke with him. Discharge instructions and OTC pain medications discussed with pt. Pt. Verbalized understanding of info given and ambulated to exit in stable condition.       Virginia Ng RN  01/25/22 2031       Virginia Ng RN  01/25/22 [id number]       Virginia Ng RN  01/25/22 2029

## 2022-02-07 ENCOUNTER — HOSPITAL ENCOUNTER (OUTPATIENT)
Dept: MRI IMAGING | Age: 57
Discharge: HOME | End: 2022-02-07
Payer: COMMERCIAL

## 2022-02-07 DIAGNOSIS — S12.600A CLOSED DISPLACED FRACTURE OF SEVENTH CERVICAL VERTEBRA, UNSPECIFIED FRACTURE MORPHOLOGY, INITIAL ENCOUNTER (HCC): ICD-10-CM

## 2022-02-07 DIAGNOSIS — S32.010A COMPRESSION FRACTURE OF L1 VERTEBRA, INITIAL ENCOUNTER (HCC): ICD-10-CM

## 2022-02-07 PROCEDURE — 72148 MRI LUMBAR SPINE W/O DYE: CPT

## 2022-02-07 PROCEDURE — 72141 MRI NECK SPINE W/O DYE: CPT

## 2022-02-07 PROCEDURE — 72146 MRI CHEST SPINE W/O DYE: CPT

## 2022-02-09 ENCOUNTER — HOSPITAL ENCOUNTER (OUTPATIENT)
Dept: GENERAL RADIOLOGY | Age: 57
Discharge: HOME | End: 2022-02-09
Payer: COMMERCIAL

## 2022-02-09 ENCOUNTER — OFFICE VISIT (OUTPATIENT)
Dept: NEUROSURGERY | Age: 57
End: 2022-02-09
Payer: COMMERCIAL

## 2022-02-09 ENCOUNTER — HOSPITAL ENCOUNTER (OUTPATIENT)
Age: 57
Discharge: HOME | End: 2022-02-09
Payer: COMMERCIAL

## 2022-02-09 VITALS
DIASTOLIC BLOOD PRESSURE: 89 MMHG | RESPIRATION RATE: 18 BRPM | WEIGHT: 315 LBS | HEART RATE: 80 BPM | HEIGHT: 75 IN | OXYGEN SATURATION: 98 % | SYSTOLIC BLOOD PRESSURE: 168 MMHG | BODY MASS INDEX: 39.17 KG/M2

## 2022-02-09 DIAGNOSIS — M25.562 ACUTE PAIN OF LEFT KNEE: Primary | ICD-10-CM

## 2022-02-09 DIAGNOSIS — M54.50 BILATERAL LOW BACK PAIN WITHOUT SCIATICA, UNSPECIFIED CHRONICITY: ICD-10-CM

## 2022-02-09 DIAGNOSIS — M25.562 ACUTE PAIN OF LEFT KNEE: ICD-10-CM
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
## 2022-02-09 PROCEDURE — 73564 X-RAY EXAM KNEE 4 OR MORE: CPT

## 2022-02-09 PROCEDURE — 99213 OFFICE O/P EST LOW 20 MIN: CPT

## 2022-02-09 RX ORDER — CYCLOBENZAPRINE HCL 5 MG
5 TABLET ORAL 2 TIMES DAILY PRN
Qty: 20 TABLET | Refills: 0 | Status: SHIPPED | OUTPATIENT
Start: 2022-02-09 | End: 2022-02-24 | Stop reason: SDUPTHER

## 2022-02-09 ASSESSMENT — ENCOUNTER SYMPTOMS
COUGH: 0
CONSTIPATION: 0
COLOR CHANGE: 0
NAUSEA: 0
SHORTNESS OF BREATH: 0
TROUBLE SWALLOWING: 0
BACK PAIN: 1

## 2022-02-09 NOTE — LETTER
[facility] Neurosurgery  [address] [address] [facility] [address]  Phone: [phone]  Fax: [phone]    JANET Chavez CNP        February 9, 2022     Patient: Neri Velasquez   YOB: 1965   Date of Visit: 2/9/2022       To Whom It May Concern: It is my medical opinion that Carlos Contreras should remain out of work until 2-18-22. If you have any questions or concerns, please don't hesitate to call.     Sincerely,        JANET Chavez CNP

## 2022-02-09 NOTE — PROGRESS NOTES
Neurosurgery Follow up Note    Date:2/9/2022         Patient Name:Rodrigo Tabor     YOB: 1965     Age:56 y.o. Reason for Follow up:  2 week follow up with MRI results      Chief Complaint:   Chief Complaint   Patient presents with    Follow-up     2 wk follow up - MRI lumbar / thoracic/ cervical     Other     funny feeling- numbness/tingling in L knee         Subjective    Refugio Connell is a 80-year-old male who presents to the office alone ambulating without assistive device. He presents office today for a 2-week follow-up with MRI results. On 1/25/2022 patient was climbing down a ladder on his semi and he slipped and fell onto the ground which was roughly 3 feet off the ground. He landed on his buttocks and back. He reported to the office ambulatory care center for evaluation. CT scans were completed which showed multiple spinous process fractures most notably at C7 and T1 along with at C6-C7 deformity fracture of L1, or evidence on CT scanning which is age-indeterminate. Patient arrives to the office today with cervical collar intact. Patient denies pain. Patient denies weakness in his upper or lower extremities. MRI of cervical spine shows straightening of the normal Cervical lordosis and degenerative changes in the vertebral body endplates adjacent to the CT for to C5, C5-C6, and C6-C7 disc spaces. Abnormal signal intensity in the spinous process of C7 consistent with an old pseudarthrosis rather than an acute fracture. The surrounding ligaments are intact. Degenerative changes resulting in mild to moderate canal stenosis, indentation upon underlying spinal cord, moderate to severe left and moderate right foraminal stenosis at C4-5. There is a mild canal stenosis and indentation upon underlying spinal cord and moderate bilateral foraminal stenosis C5-C6. There is mild canal stenosis with no definite cord compression at C6-C7.   MRI of the thoracic spine show degenerative changes in the vertebral body endplates adjacent to the T2-T3, T4-T5, T5-T6, T6 37, T7-T8, T8-T9, T9-T10, T10-T11, and T11-T12 vertebral bodies. No evidence for acute compression fracture. Small disc protrusions at T2-T3 and T4 to T5 resulting in mild canal stenosis with no cord compression. There is no cord compression at any level. There is left-sided foraminal stenosis at T9-T10. MRI of the lumbar spine showed no evidence of acute compression fracture. No evidence of ligamentous injury. There is mild canal stenosis, moderate left and mild to moderate right-sided foraminal stenosis at L3-4. There is mild canal and mild/moderate bilateral foraminal stenosis at L4-5. There is mild to moderate bilateral foraminal stenosis with no canal stenosis L L5-S1. Degenerative changes involving the sacroiliac joints bilaterally. Atrophy of the paraspinal muscles posterior especially L5. Dependent edema in the subcutaneous soft tissues dorsally especially between L1 and L4. He stated that he is having neck stiffness and thoracic pain. He sated that he is having muscle spasms that comes and goes in his mid and lower back. He stated that the pain is inconsistent. Patient stated that he is having pain in his lumbar area and pain in his left upper buttock. He stated that he is  having numbness and tingling in the left knee specifically underneath knee cap where he previously had surgery. Patient stated he started noticing this more within the last week. Patient stated his pain is a 4 at its worst and a 4 on average. He is taking ibuprofen for pain. He stated that he is only taking one 200 mg ibuprofen in the am and one 200mg at night. He stated that the neck and back pain is getting better but the left knee is getting worse. He stated that he has been icing his back which has been helping. Review of Systems   Review of Systems   Constitutional: Negative for activity change, appetite change, fatigue and fever.    HENT: Negative for trouble swallowing. Eyes: Negative for visual disturbance. Respiratory: Negative for cough and shortness of breath. Cardiovascular: Negative for chest pain. Gastrointestinal: Negative for constipation and nausea. Musculoskeletal: Positive for back pain, myalgias and neck stiffness. Negative for gait problem. Skin: Negative for color change, rash and wound. Neurological: Positive for numbness. Negative for dizziness and weakness. Psychiatric/Behavioral: Negative for confusion and sleep disturbance. The patient is not nervous/anxious. Medications     Current Outpatient Medications on File Prior to Visit   Medication Sig Dispense Refill    ibuprofen (ADVIL;MOTRIN) 600 MG tablet Take 600 mg by mouth every 6 hours as needed for Pain      atorvastatin (LIPITOR) 10 MG tablet Take 1 tablet by mouth nightly 90 tablet 3    escitalopram (LEXAPRO) 20 MG tablet Take 1 tablet by mouth daily 90 tablet 3    ezetimibe (ZETIA) 10 MG tablet Take 1 tablet by mouth nightly 90 tablet 3    lisinopril (PRINIVIL;ZESTRIL) 10 MG tablet Take 1 tablet by mouth every morning 90 tablet 3    aspirin (SM ASPIRIN ADULT LOW STRENGTH) 81 MG EC tablet Take 1 tablet by mouth daily 90 tablet 3    etodolac (LODINE) 400 MG tablet Take 1 tablet by mouth 2 times daily 180 tablet 3    sildenafil (VIAGRA) 100 MG tablet Take 1 tablet by mouth as needed for Erectile Dysfunction 30 tablet 3     No current facility-administered medications on file prior to visit. Past History    Past Medical History:   has a past medical history of Arthritis, Hyperlipidemia, Hypertension, and CARINA on CPAP. Social History:   reports that he quit smoking about 3 years ago. His smoking use included cigarettes. He has never used smokeless tobacco. He reports that he does not drink alcohol and does not use drugs.      Family History:   Family History   Problem Relation Age of Onset   Yaniratalita Calderon Pacemaker Father     Hypertension Father    Bebe Mancera Atrial Fibrillation Sister        Physical Examination      Vitals:  BP (!) 168/89 (Site: Right Upper Arm, Position: Sitting, Cuff Size: Large Adult)   Pulse 80   Resp 18   Ht 6' 3\" (1.905 m)   Wt (!) 386 lb (175.1 kg)   SpO2 98%   BMI 48.25 kg/m²       Physical Exam  Constitutional:       Appearance: He is obese. He is not ill-appearing. Cardiovascular:      Rate and Rhythm: Normal rate. Pulses: Normal pulses. Pulmonary:      Effort: Pulmonary effort is normal.   Abdominal:      General: Abdomen is flat. Palpations: Abdomen is soft. Musculoskeletal:         General: Swelling and tenderness present. Cervical back: Tenderness present. No swelling or spasms. Normal range of motion. Thoracic back: Spasms and tenderness present. Lumbar back: Swelling, spasms and tenderness present. Negative right straight leg raise test and negative left straight leg raise test.        Back:         Legs:    Skin:     General: Skin is warm and dry. Neurological:      Mental Status: He is alert and oriented to person, place, and time. Sensory: Sensory deficit (numbness left knee) present. Motor: No weakness. Gait: Gait normal.      Comments: RLE 5/5  LLE 5/5   Psychiatric:         Mood and Affect: Mood normal.         Behavior: Behavior normal.         Thought Content: Thought content normal.         Judgment: Judgment normal.       Neurologic Exam     Mental Status   Oriented to person, place, and time. Imaging   Imaging last 30 days:  CT CERVICAL SPINE WO CONTRAST    Result Date: 1/25/2022  Acute fractures of the spinous process of C7 and T1. This finding is best seen on sagittal images #26 and #27. **This report has been created using voice recognition software. It may contain minor errors which are inherent in voice recognition technology. ** Final report electronically signed by Dr Robbins on 1/25/2022 7:41 PM    CT THORACIC SPINE WO CONTRAST    Result Date: T5-6, T6-7, T7-8, T8-9, T9-10, T10-11 and T11-12 vertebral bodies. No evidence for an acute compression fracture. 2. Small disc protrusions at T2-3 and T4-5 resulting in mild canal stenosis with no cord compression. 3. Minimally bulging discs at T3-4, T6-T7, T7-8, T9-T10 and T10-11. There is no cord compression at any level. There is left-sided from stenosis at T9-T10. 4. Otherwise negative MRI scan of the thoracic spine. **This report has been created using voice recognition software. It may contain minor errors which are inherent in voice recognition technology. ** Final report electronically signed by DR Mireya Vera on 2/8/2022 10:40 AM    MRI LUMBAR SPINE WO CONTRAST    Result Date: 2/8/2022   1. No evidence of an acute compression fracture. No definite evidence of a ligamentous injury. 2. Postoperative changes between L5 and S1. 3. There is mild canal, moderate left and mild-to-moderate right-sided foraminal stenosis at L3-4. 4. There is mild canal and mild-to-moderate bilateral foraminal stenosis at L4-5. 5. There is mild-to-moderate bilateral foraminal stenosis with no canal stenosis at L5-S1. 6. Degenerative change involving the sacroiliac joints bilaterally. 7. Atrophy in the paraspinal muscles posteriorly especially at L5. 8. Dependent edema in the subcutaneous soft tissues dorsally especially between L1 and L4. **This report has been created using voice recognition software. It may contain minor errors which are inherent in voice recognition technology. ** Final report electronically signed by DR Mireya Vera on 2/8/2022 10:16 AM         Assessment and Plan:          1.  2 week follow up with MRI results  2. MRI results discussed and reviewed with patient   3. Ice to back 20 minutes at a time every two hours. 4. Xray left knee   5. Work slip provided. 6. Ok to remove cervical collar  7. Continue taking ibuprofen for pain. Discussed with patient on dosing of ibuprofen for his pain.   8. Flexeril 5mg BID prn for muscle spasms- Do not drive while taking medication  9. Discussed with patient take walks within house and to not sit for long periods of time  10. Follow up in 1 week. 11. All patient questions answered. Pt voiced understanding.  Patient instructed to call the office with any questions or concerns    Electronically signed by JANET Ziegler CNP on 2/9/22 at 8:39 AM EST

## 2022-02-10 ENCOUNTER — TELEPHONE (OUTPATIENT)
Dept: NEUROSURGERY | Age: 57
End: 2022-02-10

## 2022-02-16 NOTE — TELEPHONE ENCOUNTER
Please call patient and check on how he is doing and if his left knee is any better. If he is still having issues please inform him to go to [facility].

## 2022-02-17 NOTE — PROGRESS NOTES
Neurosurgery Follow up Note    Date:2/18/2022         Patient Name:Rodrigo Hidalgo     YOB: 1965     Age:56 y.o. Reason for Follow up: Follow up for with left knee xray and discuss further plan      Chief Complaint:   Chief Complaint   Patient presents with    Follow-up     1 wk follow up Xray     Other     pain is worse when walking - muscle spasms         Subjective   Janeen Pedraza is a 64year old male who presents to the office alone ambulating without assistive device. Patient stated that he is having neck pain. He stated that his thoracic pain and left knee pain is gone. He stated that he has lumbar pain that comes and goes. He stated that he is having muscles spasms. Patient stated his pain at its worse is a 5 and on average a 4. Patient stated that standing for long periods of time and walking makes his neck pain worse. Patient stated that he also has increased pain when he looks down. He stated that it is hard to grasps objects with his right hand. He stated that he is now having headaches. He stated that the pain in his neck is radiating down his right arm. He stated he is now having numbness and tingling in both of his arms that right worse than the left. He sated his numbness on his right side starts at his posterior right shoulder and down the lateral side of his right arm to his forearm. He stated that is consistent. He stated the numbness and tingling in his left arm is on the lateral aspect of his upper and lower arm. He stated it is inconsistent. He stated that he has numbness in his bilateral thumbs that is constant and he stated his right thumb numbness is worse than left. He stated that he has noticed his numbness more when he is sitting forward and letting his hands hang down to his side or walking. He stated that sitting back in a chair or resting makes the numbness tolerable. Patient denies bowel or bladder change. Patient stated that his gait is steady.   Patient stated that his left knee pain is better since last appointment. Patient has history of medial meniscus tear of the left knee. Xray of patients left knee showed minimal degenerative changes. No acute findings. Patient stated that he has been using heat to his lower back and noticing waking up stiff the next day. Patient stated he has not been using ice for pain relief. Patient stated the Flexeril is helping with his muscle spasms and he has been taking one in the morning and one at night. Review of Systems   Review of Systems   Constitutional: Positive for activity change. Negative for appetite change, fatigue and fever. HENT: Negative for trouble swallowing. Eyes: Negative for visual disturbance. Respiratory: Negative for cough and shortness of breath. Cardiovascular: Negative for chest pain. Gastrointestinal: Negative for constipation and nausea. Genitourinary: Negative for difficulty urinating. Musculoskeletal: Positive for back pain, myalgias, neck pain and neck stiffness. Negative for gait problem. Skin: Negative for color change, rash and wound. Neurological: Positive for weakness, numbness and headaches. Negative for dizziness. Psychiatric/Behavioral: Positive for sleep disturbance. Negative for confusion. The patient is not nervous/anxious.         Medications     Current Outpatient Medications on File Prior to Visit   Medication Sig Dispense Refill    cyclobenzaprine (FLEXERIL) 5 MG tablet Take 1 tablet by mouth 2 times daily as needed for Muscle spasms Do not drive while taking medication 20 tablet 0    ibuprofen (ADVIL;MOTRIN) 600 MG tablet Take 600 mg by mouth every 6 hours as needed for Pain      atorvastatin (LIPITOR) 10 MG tablet Take 1 tablet by mouth nightly 90 tablet 3    escitalopram (LEXAPRO) 20 MG tablet Take 1 tablet by mouth daily 90 tablet 3    ezetimibe (ZETIA) 10 MG tablet Take 1 tablet by mouth nightly 90 tablet 3    lisinopril (PRINIVIL;ZESTRIL) 10 MG tablet Take 1 tablet by mouth every morning 90 tablet 3    aspirin (SM ASPIRIN ADULT LOW STRENGTH) 81 MG EC tablet Take 1 tablet by mouth daily 90 tablet 3    etodolac (LODINE) 400 MG tablet Take 1 tablet by mouth 2 times daily 180 tablet 3    sildenafil (VIAGRA) 100 MG tablet Take 1 tablet by mouth as needed for Erectile Dysfunction 30 tablet 3     No current facility-administered medications on file prior to visit. Past History    Past Medical History:   has a past medical history of Arthritis, Hyperlipidemia, Hypertension, and CARINA on CPAP. Social History:   reports that he quit smoking about 3 years ago. His smoking use included cigarettes. He has never used smokeless tobacco. He reports that he does not drink alcohol and does not use drugs. Family History:   Family History   Problem Relation Age of Onset    Pacemaker Father     Hypertension Father     Atrial Fibrillation Sister        Physical Examination      Vitals:  BP (!) 169/88 (Site: Left Upper Arm, Position: Sitting, Cuff Size: Large Adult)   Pulse 80   Resp 16   Ht 6' 3\" (1.905 m)   Wt (!) 385 lb (174.6 kg)   SpO2 98%   BMI 48.12 kg/m²       Physical Exam  Constitutional:       Appearance: Normal appearance. He is obese. He is not ill-appearing. HENT:      Nose: Nose normal.      Mouth/Throat:      Mouth: Mucous membranes are moist.   Eyes:      Pupils: Pupils are equal, round, and reactive to light. Cardiovascular:      Rate and Rhythm: Normal rate. Pulses: Normal pulses. Pulmonary:      Effort: Pulmonary effort is normal.   Abdominal:      General: Bowel sounds are normal.   Musculoskeletal:         General: Tenderness present. No swelling. Right hand: No swelling. Decreased strength. Decreased sensation. Left hand: No swelling. Decreased sensation. Cervical back: Spasms and tenderness present. No rigidity. Thoracic back: No tenderness. Lumbar back: Spasms and tenderness present.         Back:    Skin: General: Skin is warm and dry. Neurological:      Mental Status: He is oriented to person, place, and time. Sensory: Sensory deficit present. Motor: Weakness present. Gait: Gait normal.      Comments: RUE 4/5  LUE 5/5  RLE 5/5  LLE 5/5   Psychiatric:         Mood and Affect: Mood normal.         Behavior: Behavior normal.         Thought Content: Thought content normal.         Judgment: Judgment normal.       Neurologic Exam     Mental Status   Oriented to person, place, and time. Cranial Nerves     CN III, IV, VI   Pupils are equal, round, and reactive to light. Imaging   Imaging last 30 days:  XR KNEE LEFT (MIN 4 VIEWS)    Result Date: 2/9/2022  Minimal degenerative changes. No acute findings. **This report has been created using voice recognition software. It may contain minor errors which are inherent in voice recognition technology. ** Final report electronically signed by Dr. James Ba on 2/9/2022 2:03 PM    CT CERVICAL SPINE WO CONTRAST    Result Date: 1/25/2022  Acute fractures of the spinous process of C7 and T1. This finding is best seen on sagittal images #26 and #27. **This report has been created using voice recognition software. It may contain minor errors which are inherent in voice recognition technology. ** Final report electronically signed by Dr Clari Khan on 1/25/2022 7:41 PM    CT THORACIC SPINE WO CONTRAST    Result Date: 1/25/2022  Acute fracture of the spinous process of T1. No compression fracture. Multilevel degenerative changes. **This report has been created using voice recognition software. It may contain minor errors which are inherent in voice recognition technology. ** Final report electronically signed by Dr Clari Khan on 1/25/2022 7:54 PM    CT LUMBAR SPINE WO CONTRAST    Result Date: 1/25/2022  1. Acute fracture of the anterior superior endplate of the L4 vertebral body. 2. Mild age-indeterminate wedge deformity of the L3 vertebral body. voice recognition technology. ** Final report electronically signed by DR Cheryl Evans on 2/8/2022 10:40 AM    MRI LUMBAR SPINE WO CONTRAST    Result Date: 2/8/2022   1. No evidence of an acute compression fracture. No definite evidence of a ligamentous injury. 2. Postoperative changes between L5 and S1. 3. There is mild canal, moderate left and mild-to-moderate right-sided foraminal stenosis at L3-4. 4. There is mild canal and mild-to-moderate bilateral foraminal stenosis at L4-5. 5. There is mild-to-moderate bilateral foraminal stenosis with no canal stenosis at L5-S1. 6. Degenerative change involving the sacroiliac joints bilaterally. 7. Atrophy in the paraspinal muscles posteriorly especially at L5. 8. Dependent edema in the subcutaneous soft tissues dorsally especially between L1 and L4. **This report has been created using voice recognition software. It may contain minor errors which are inherent in voice recognition technology. ** Final report electronically signed by DR Cheryl Evans on 2/8/2022 10:16 AM         Assessment and Plan:          1. Follow up with xray results  2. Xrays and MRI results reviewed with patient and Dr. Uriah Shelton  3. Discussed with patient the need for surgery an anterior cervical diskectomy and fusion at C4-C5 and C5-6  And any other level as indicated during surgery  4. I explained for patient  the recommended surgical intervention and the alternative treatment options in details, as well as the associated risks and benefits. 5.  I discussed the possible complications of surgery again with patient in detail, including excessive blood loss requiring transfusion, infection that may become meningitis, problem with heart lung and kidney as a result of general anesthesia such as heart attack, pneumonia, kidney shutdown and stroke.  I also discussed the possible complication of the operations in and around the spine such as, death, paralysis, weakness on one side or the other of the body, decreased sensation or pain on one side or the other of the body, inability to control bowel/bladder,problems with swallowing, problems with sexual function, postoperative meningitis, postoperative development of a blood clot that may require another operation, leakage of fluid from the wound that may require another operation. The patient understands that no guarantee can be made regarding the extent of post-operative pain or other per op  symptoms relief. 6.  I explained to him  that he may need another cervical spine surgery in the future. 7.  All question and concerns were addressed and answered. 8.  Patient elected to proceed with the surgical treatment and she signed the surgical consent. 9. The plan now for  is to schedule him for surgery in the near future. After, obtaining the necessary pre op medical clearness. 10. Will need cardiology and PCP clearance before surgery. 11. Do not use heat to lumbar or neck area for pain  12. Apply ice for 20 minutes at a times every 2 hours for pain  13. Continue taking flexeril for muscle spasms  14. Fall precautions  15. Work slip provided  16. Follow up in 1 month  17. All patient questions answered. Pt voiced understanding.  Patient instructed to call the office with any questions or concerns      Electronically signed by JANET Montesinos CNP on 2/17/22 at 3:00 PM EST

## 2022-02-18 ENCOUNTER — OFFICE VISIT (OUTPATIENT)
Dept: NEUROSURGERY | Age: 57
End: 2022-02-18
Payer: COMMERCIAL

## 2022-02-18 VITALS
BODY MASS INDEX: 39.17 KG/M2 | HEART RATE: 80 BPM | WEIGHT: 315 LBS | DIASTOLIC BLOOD PRESSURE: 88 MMHG | SYSTOLIC BLOOD PRESSURE: 169 MMHG | HEIGHT: 75 IN | OXYGEN SATURATION: 98 % | RESPIRATION RATE: 16 BRPM

## 2022-02-18 DIAGNOSIS — M50.20 PROTRUSION OF CERVICAL INTERVERTEBRAL DISC: ICD-10-CM

## 2022-02-18 DIAGNOSIS — Q76.49 SPINE DEFORMITY: ICD-10-CM

## 2022-02-18 DIAGNOSIS — Z01.818 PRE-OP TESTING: ICD-10-CM

## 2022-02-18 DIAGNOSIS — M79.18 MYOFASCIAL PAIN: ICD-10-CM

## 2022-02-18 DIAGNOSIS — M54.2 NECK PAIN: Primary | ICD-10-CM

## 2022-02-18 DIAGNOSIS — M54.12 CERVICAL RADICULAR PAIN: ICD-10-CM

## 2022-02-18 PROCEDURE — 99213 OFFICE O/P EST LOW 20 MIN: CPT

## 2022-02-18 ASSESSMENT — ENCOUNTER SYMPTOMS
TROUBLE SWALLOWING: 0
CONSTIPATION: 0
COLOR CHANGE: 0
BACK PAIN: 1
SHORTNESS OF BREATH: 0
NAUSEA: 0
COUGH: 0

## 2022-02-18 NOTE — LETTER
[address] [facility]  [address]  Phone: [phone]  Fax: [phone]    JANET Monterroso CNP        February 18, 2022     Patient: Giovanny Kerr   YOB: 1965   Date of Visit: 2/18/2022       To Whom It May Concern: It is my medical opinion that Yosvany Jones should remain out of work until cleared post op. .    If you have any questions or concerns, please don't hesitate to call.     Sincerely,        JANET Monterroso CNP

## 2022-02-24 RX ORDER — CYCLOBENZAPRINE HCL 5 MG
5 TABLET ORAL 2 TIMES DAILY PRN
Qty: 20 TABLET | Refills: 0 | Status: SHIPPED | OUTPATIENT
Start: 2022-02-24 | End: 2022-03-06

## 2022-03-25 ENCOUNTER — OFFICE VISIT (OUTPATIENT)
Dept: NEUROSURGERY | Age: 57
End: 2022-03-25
Payer: COMMERCIAL

## 2022-03-25 VITALS
HEIGHT: 75 IN | DIASTOLIC BLOOD PRESSURE: 76 MMHG | BODY MASS INDEX: 39.17 KG/M2 | SYSTOLIC BLOOD PRESSURE: 122 MMHG | WEIGHT: 315 LBS

## 2022-03-25 DIAGNOSIS — M54.12 CERVICAL RADICULAR PAIN: Primary | ICD-10-CM

## 2022-03-25 DIAGNOSIS — M50.20 PROTRUSION OF CERVICAL INTERVERTEBRAL DISC: ICD-10-CM

## 2022-03-25 DIAGNOSIS — M79.18 MYOFASCIAL PAIN: ICD-10-CM

## 2022-03-25 PROCEDURE — 99213 OFFICE O/P EST LOW 20 MIN: CPT

## 2022-03-25 RX ORDER — CYCLOBENZAPRINE HCL 5 MG
5 TABLET ORAL 2 TIMES DAILY PRN
Qty: 10 TABLET | Refills: 2 | Status: SHIPPED | OUTPATIENT
Start: 2022-03-25 | End: 2022-04-24

## 2022-03-25 ASSESSMENT — ENCOUNTER SYMPTOMS
TROUBLE SWALLOWING: 0
BACK PAIN: 1
COLOR CHANGE: 0
NAUSEA: 0
COUGH: 0
SHORTNESS OF BREATH: 0
CONSTIPATION: 0

## 2022-03-25 NOTE — PROGRESS NOTES
completed. Review of Systems   Review of Systems   Constitutional: Positive for activity change. Negative for appetite change, fatigue and fever. HENT: Negative for trouble swallowing. Eyes: Negative for visual disturbance. Respiratory: Negative for cough and shortness of breath. Cardiovascular: Negative for chest pain. Gastrointestinal: Negative for constipation and nausea. Musculoskeletal: Positive for arthralgias, back pain, myalgias, neck pain and neck stiffness. Negative for gait problem. Skin: Negative for color change, rash and wound. Neurological: Positive for weakness and numbness. Negative for dizziness and headaches. Psychiatric/Behavioral: Negative for confusion and sleep disturbance. The patient is not nervous/anxious. Medications     Current Outpatient Medications on File Prior to Visit   Medication Sig Dispense Refill    escitalopram (LEXAPRO) 20 MG tablet Take 1 tablet by mouth daily 30 tablet 10    ibuprofen (ADVIL;MOTRIN) 600 MG tablet Take 600 mg by mouth every 6 hours as needed for Pain      atorvastatin (LIPITOR) 10 MG tablet Take 1 tablet by mouth nightly 90 tablet 3    ezetimibe (ZETIA) 10 MG tablet Take 1 tablet by mouth nightly 90 tablet 3    lisinopril (PRINIVIL;ZESTRIL) 10 MG tablet Take 1 tablet by mouth every morning 90 tablet 3    aspirin (SM ASPIRIN ADULT LOW STRENGTH) 81 MG EC tablet Take 1 tablet by mouth daily 90 tablet 3    etodolac (LODINE) 400 MG tablet Take 1 tablet by mouth 2 times daily 180 tablet 3    sildenafil (VIAGRA) 100 MG tablet Take 1 tablet by mouth as needed for Erectile Dysfunction 30 tablet 3     No current facility-administered medications on file prior to visit. Past History    Past Medical History:   has a past medical history of Arthritis, Hyperlipidemia, Hypertension, and CARINA on CPAP. Social History:   reports that he quit smoking about 3 years ago. His smoking use included cigarettes.  He has never used smokeless tobacco. He reports that he does not drink alcohol and does not use drugs. Family History:   Family History   Problem Relation Age of Onset    Pacemaker Father     Hypertension Father     Atrial Fibrillation Sister        Physical Examination      Vitals:  /76 (Site: Right Upper Arm, Position: Sitting, Cuff Size: Large Adult)   Ht 6' 3\" (1.905 m)   Wt (!) 385 lb (174.6 kg)   BMI 48.12 kg/m²       Physical Exam  Constitutional:       Appearance: Normal appearance. He is obese. He is not ill-appearing. HENT:      Nose: Nose normal.      Mouth/Throat:      Mouth: Mucous membranes are moist.   Eyes:      Pupils: Pupils are equal, round, and reactive to light. Cardiovascular:      Rate and Rhythm: Normal rate. Pulses: Normal pulses. Pulmonary:      Effort: Pulmonary effort is normal.   Abdominal:      General: Bowel sounds are normal.   Musculoskeletal:      Right hand: Decreased strength. Decreased sensation. Left hand: Decreased sensation. Cervical back: Normal range of motion. Spasms and tenderness present. Lumbar back: Spasms and tenderness present. Back:    Skin:     General: Skin is warm and dry. Findings: No erythema. Neurological:      Mental Status: He is alert and oriented to person, place, and time. Sensory: Sensory deficit present. Motor: Weakness present. Gait: Gait normal.      Comments: RUE 4/5  LUE 5/5  RLE 5/5   LLE 5/5   Psychiatric:         Mood and Affect: Mood normal.         Behavior: Behavior normal.         Thought Content: Thought content normal.         Judgment: Judgment normal.       Neurologic Exam     Mental Status   Oriented to person, place, and time. Cranial Nerves     CN III, IV, VI   Pupils are equal, round, and reactive to light. Imaging   Imaging last 30 days:  No results found. Assessment and Plan:          1. 1 month follow up  2.  Patient waiting on PCP and cardiology - Awaiting workers comp to clear approval for him to go to PCP and cardiology  3. Discussed with patient that his lumbar images were addressed with Dr. Uriah Shelton and he stated that he will monitor his lumbar pain at this time and we will address it once his cervical surgery is completed. 4. Fall Precautions  5. Flexeril 5mg BID PRN sent in to pharmacy-muscles spasms  6. Work [place] provided  7. Apply ice for 20 minutes at a times every 2 hours for pain  8. Awaiting preop clearance for anterior cervical diskectomy and fusion at C4-C5 and C5-6 and any other level as indicated during surgery  9. Follow up in 2 month(s). 10. All patient questions answered. Pt voiced understanding.  Patient instructed to call the office with any questions or concerns    Electronically signed by JANET Chavez CNP on 3/25/22 at 9:40 AM EDT

## 2022-03-25 NOTE — LETTER
[address] [facility]  [address] [facility] [address] [facility]  Phone: [phone]  Fax: [phone] JANET Rice CNP        March 25, 2022     Patient: Hargis Scheuermann   YOB: 1965   Date of Visit: 3/25/2022       To Whom It May Concern: It is my medical opinion that Mirna Rachel should remain out of work until surgery is completed. If you have any questions or concerns, please don't hesitate to call.     Sincerely,        JANET Loera - CNP

## 2022-04-04 ENCOUNTER — TELEPHONE (OUTPATIENT)
Dept: NEUROSURGERY | Age: 57
End: 2022-04-04

## 2022-04-04 ENCOUNTER — APPOINTMENT (OUTPATIENT)
Dept: CT IMAGING | Age: 57
End: 2022-04-04
Payer: COMMERCIAL

## 2022-04-04 ENCOUNTER — HOSPITAL ENCOUNTER (EMERGENCY)
Age: 57
Discharge: HOME | End: 2022-04-04
Attending: EMERGENCY MEDICINE
Payer: COMMERCIAL

## 2022-04-04 VITALS
SYSTOLIC BLOOD PRESSURE: 146 MMHG | RESPIRATION RATE: 18 BRPM | WEIGHT: 315 LBS | BODY MASS INDEX: 40.43 KG/M2 | HEART RATE: 62 BPM | OXYGEN SATURATION: 96 % | TEMPERATURE: 98.2 F | DIASTOLIC BLOOD PRESSURE: 82 MMHG | HEIGHT: 74 IN

## 2022-04-04 DIAGNOSIS — G89.29 ACUTE EXACERBATION OF CHRONIC LOW BACK PAIN: Primary | ICD-10-CM

## 2022-04-04 DIAGNOSIS — M54.50 ACUTE EXACERBATION OF CHRONIC LOW BACK PAIN: Primary | ICD-10-CM

## 2022-04-04 PROCEDURE — 99284 EMERGENCY DEPT VISIT MOD MDM: CPT

## 2022-04-04 PROCEDURE — 72131 CT LUMBAR SPINE W/O DYE: CPT

## 2022-04-04 PROCEDURE — 6360000002 HC RX W HCPCS: Performed by: EMERGENCY MEDICINE

## 2022-04-04 PROCEDURE — 96372 THER/PROPH/DIAG INJ SC/IM: CPT

## 2022-04-04 RX ORDER — METHYLPREDNISOLONE SODIUM SUCCINATE 125 MG/2ML
60 INJECTION, POWDER, LYOPHILIZED, FOR SOLUTION INTRAMUSCULAR; INTRAVENOUS ONCE
Status: COMPLETED | OUTPATIENT
Start: 2022-04-04 | End: 2022-04-04

## 2022-04-04 RX ORDER — KETOROLAC TROMETHAMINE 30 MG/ML
30 INJECTION, SOLUTION INTRAMUSCULAR; INTRAVENOUS ONCE
Status: COMPLETED | OUTPATIENT
Start: 2022-04-04 | End: 2022-04-04

## 2022-04-04 RX ORDER — PREDNISONE 20 MG/1
40 TABLET ORAL DAILY
Qty: 14 TABLET | Refills: 0 | Status: SHIPPED | OUTPATIENT
Start: 2022-04-04 | End: 2022-04-11

## 2022-04-04 RX ORDER — IBUPROFEN 600 MG/1
600 TABLET ORAL EVERY 6 HOURS PRN
Qty: 120 TABLET | Refills: 0 | Status: SHIPPED | OUTPATIENT
Start: 2022-04-04

## 2022-04-04 RX ORDER — OXYCODONE HYDROCHLORIDE AND ACETAMINOPHEN 5; 325 MG/1; MG/1
1 TABLET ORAL EVERY 6 HOURS PRN
Qty: 12 TABLET | Refills: 0 | Status: SHIPPED | OUTPATIENT
Start: 2022-04-04 | End: 2022-04-07

## 2022-04-04 RX ORDER — ORPHENADRINE CITRATE 30 MG/ML
60 INJECTION INTRAMUSCULAR; INTRAVENOUS ONCE
Status: COMPLETED | OUTPATIENT
Start: 2022-04-04 | End: 2022-04-04

## 2022-04-04 RX ADMIN — METHYLPREDNISOLONE SODIUM SUCCINATE 60 MG: 125 INJECTION, POWDER, FOR SOLUTION INTRAMUSCULAR; INTRAVENOUS at 03:31

## 2022-04-04 RX ADMIN — HYDROMORPHONE HYDROCHLORIDE 1 MG: 1 INJECTION, SOLUTION INTRAMUSCULAR; INTRAVENOUS; SUBCUTANEOUS at 05:15

## 2022-04-04 RX ADMIN — ORPHENADRINE CITRATE 60 MG: 30 INJECTION INTRAMUSCULAR; INTRAVENOUS at 03:31

## 2022-04-04 RX ADMIN — KETOROLAC TROMETHAMINE 30 MG: 30 INJECTION, SOLUTION INTRAMUSCULAR; INTRAVENOUS at 03:30

## 2022-04-04 RX ADMIN — HYDROMORPHONE HYDROCHLORIDE 1 MG: 1 INJECTION, SOLUTION INTRAMUSCULAR; INTRAVENOUS; SUBCUTANEOUS at 03:15

## 2022-04-04 ASSESSMENT — PAIN DESCRIPTION - PAIN TYPE
TYPE: ACUTE PAIN
TYPE: ACUTE PAIN

## 2022-04-04 ASSESSMENT — ENCOUNTER SYMPTOMS
CHEST TIGHTNESS: 0
BLOOD IN STOOL: 0
NAUSEA: 0
ABDOMINAL DISTENTION: 0
VOMITING: 0
SHORTNESS OF BREATH: 0
TROUBLE SWALLOWING: 0
SORE THROAT: 0
RHINORRHEA: 0
ABDOMINAL PAIN: 0
EYE DISCHARGE: 0
WHEEZING: 0
EYE ITCHING: 0
SINUS PRESSURE: 0
PHOTOPHOBIA: 0
CHOKING: 0
BACK PAIN: 1
EYE REDNESS: 0
CONSTIPATION: 0
COUGH: 0
DIARRHEA: 0
VOICE CHANGE: 0
EYE PAIN: 0

## 2022-04-04 ASSESSMENT — PAIN DESCRIPTION - LOCATION
LOCATION: BACK
LOCATION: BACK

## 2022-04-04 ASSESSMENT — PAIN DESCRIPTION - FREQUENCY: FREQUENCY: CONTINUOUS

## 2022-04-04 ASSESSMENT — PAIN SCALES - GENERAL
PAINLEVEL_OUTOF10: 3
PAINLEVEL_OUTOF10: 6
PAINLEVEL_OUTOF10: 9

## 2022-04-04 ASSESSMENT — PAIN - FUNCTIONAL ASSESSMENT: PAIN_FUNCTIONAL_ASSESSMENT: 0-10

## 2022-04-04 ASSESSMENT — PAIN DESCRIPTION - ORIENTATION
ORIENTATION: LOWER
ORIENTATION: LOWER

## 2022-04-04 ASSESSMENT — PAIN DESCRIPTION - DESCRIPTORS: DESCRIPTORS: SHARP

## 2022-04-04 NOTE — ED PROVIDER NOTES
[facility]  eMERGENCY dEPARTMENT eNCOUnter          CHIEF COMPLAINT       Chief Complaint   Patient presents with    Back Pain       Nurses Notes reviewed and I agree except as noted in the HPI. HISTORY OF PRESENT ILLNESS    Ana M Valadez is a 64 y.o. male who presents worsening low back pain. Apparently the patient has a history of low back pain. He is a  he is grossly overweight. Patient states that over the last couple days the pain has gotten worse until today he could not stand it he could not get up and could not move. Patient states that the pain does not radiate or refer is not going down his legs. He has no loss of bowel or bladder function. He has no numbness and tingling in his feet. No numbness and tingling in his groin. Patient states that he has no recent trauma. No idea why his pain should flareup at this time. Patient sees Dr. Hilton Ramires who has done his previous surgeries. Currently patient is resting on the cot no apparent distress mild to moderate amount of pain. REVIEW OF SYSTEMS     Review of Systems   Constitutional: Negative for activity change, appetite change, diaphoresis, fatigue and unexpected weight change. HENT: Negative for congestion, ear discharge, ear pain, hearing loss, rhinorrhea, sinus pressure, sore throat, trouble swallowing and voice change. Eyes: Negative for photophobia, pain, discharge, redness and itching. Respiratory: Negative for cough, choking, chest tightness, shortness of breath and wheezing. Cardiovascular: Negative for chest pain, palpitations and leg swelling. Gastrointestinal: Negative for abdominal distention, abdominal pain, blood in stool, constipation, diarrhea, nausea and vomiting. Endocrine: Negative for polydipsia, polyphagia and polyuria. Genitourinary: Negative for decreased urine volume, difficulty urinating, dysuria, enuresis, frequency, hematuria and urgency.    Musculoskeletal: Positive for arthralgias, back pain and myalgias. Negative for gait problem, neck pain and neck stiffness. Skin: Negative for pallor and rash. Allergic/Immunologic: Negative for immunocompromised state. Neurological: Negative for dizziness, tremors, seizures, syncope, facial asymmetry, weakness, light-headedness, numbness and headaches. Hematological: Negative for adenopathy. Does not bruise/bleed easily. Psychiatric/Behavioral: Negative for agitation, hallucinations and suicidal ideas. The patient is not nervous/anxious. PAST MEDICAL HISTORY    has a past medical history of Arthritis, Hyperlipidemia, Hypertension, and CARINA on CPAP. SURGICAL HISTORY      has a past surgical history that includes back surgery; Carpal tunnel release (Right); Colonoscopy; and ARTHROSCOPY / ARTHROTOMY KNEE (Left, 2/21/2019). CURRENT MEDICATIONS       Previous Medications    ASPIRIN (SM ASPIRIN ADULT LOW STRENGTH) 81 MG EC TABLET    Take 1 tablet by mouth daily    ATORVASTATIN (LIPITOR) 10 MG TABLET    Take 1 tablet by mouth nightly    CYCLOBENZAPRINE (FLEXERIL) 5 MG TABLET    Take 1 tablet by mouth 2 times daily as needed for Muscle spasms    ESCITALOPRAM (LEXAPRO) 20 MG TABLET    Take 1 tablet by mouth daily    ETODOLAC (LODINE) 400 MG TABLET    Take 1 tablet by mouth 2 times daily    EZETIMIBE (ZETIA) 10 MG TABLET    Take 1 tablet by mouth nightly    LISINOPRIL (PRINIVIL;ZESTRIL) 10 MG TABLET    Take 1 tablet by mouth every morning    SILDENAFIL (VIAGRA) 100 MG TABLET    Take 1 tablet by mouth as needed for Erectile Dysfunction       ALLERGIES     is allergic to latex and tape [adhesive tape]. FAMILY HISTORY     He indicated that his mother is alive. He indicated that his father is alive. He indicated that his sister is alive. family history includes Atrial Fibrillation in his sister; Hypertension in his father; Pacemaker in his father. SOCIAL HISTORY      reports that he quit smoking about 3 years ago.  His smoking use included cigarettes. He has never used smokeless tobacco. He reports that he does not drink alcohol and does not use drugs. PHYSICAL EXAM     INITIAL VITALS:  height is 6' 2\" (1.88 m) and weight is 365 lb (165.6 kg) (abnormal). His axillary temperature is 98.2 °F (36.8 °C). His blood pressure is 146/82 (abnormal) and his pulse is 62. His respiration is 18 and oxygen saturation is 96%. Physical Exam  Vitals and nursing note reviewed. Constitutional:       General: He is not in acute distress. Appearance: He is well-developed. He is not diaphoretic. HENT:      Head: Normocephalic and atraumatic. Right Ear: External ear normal.      Left Ear: External ear normal.      Nose: Nose normal.   Eyes:      General: Lids are normal. No scleral icterus. Right eye: No discharge. Left eye: No discharge. Conjunctiva/sclera: Conjunctivae normal.      Right eye: No exudate. Left eye: No exudate. Pupils: Pupils are equal, round, and reactive to light. Neck:      Thyroid: No thyromegaly. Vascular: No JVD. Trachea: No tracheal deviation. Cardiovascular:      Rate and Rhythm: Normal rate and regular rhythm. Pulses: Normal pulses. Heart sounds: Normal heart sounds, S1 normal and S2 normal. No murmur heard. No friction rub. No gallop. Pulmonary:      Effort: Pulmonary effort is normal. No respiratory distress. Breath sounds: Normal breath sounds. No stridor. No wheezing or rales. Chest:      Chest wall: No tenderness. Abdominal:      General: Bowel sounds are normal. There is no distension. Palpations: Abdomen is soft. There is no mass. Tenderness: There is no abdominal tenderness. There is no guarding or rebound. Musculoskeletal:      Cervical back: Normal, normal range of motion and neck supple. Normal range of motion. Thoracic back: Normal.      Lumbar back: Spasms and tenderness present.  No swelling, edema, deformity, signs of trauma, lacerations or bony tenderness. Decreased range of motion. Negative right straight leg raise test and negative left straight leg raise test. No scoliosis. Comments: Patient limited range of motion is due to pain. Lymphadenopathy:      Cervical: No cervical adenopathy. Skin:     General: Skin is warm and dry. Findings: No bruising, ecchymosis, lesion or rash. Neurological:      Mental Status: He is alert and oriented to person, place, and time. Cranial Nerves: No cranial nerve deficit. Sensory: No sensory deficit. Coordination: Coordination normal.      Deep Tendon Reflexes: Reflexes are normal and symmetric. Psychiatric:         Speech: Speech normal.         Behavior: Behavior normal.         Thought Content: Thought content normal.         Judgment: Judgment normal.           DIFFERENTIAL DIAGNOSIS:   Back strain, back sprain, bulging disc, facet arthropathy, failure of hardware    DIAGNOSTIC RESULTS     EKG: All EKG's are interpreted by the Emergency Department Physician who either signs or Co-signs this chart in the absence of a cardiologist.  None    RADIOLOGY: non-plain film images(s) such as CT, Ultrasound and MRI are read by the radiologist.  CT LUMBAR SPINE WO CONTRAST   Final Result   Impression:   1. No acute fracture or significant subluxation. 2. Stable postsurgical changes at L5-S1.   3. Suspicion for moderate to high-grade central canal stenosis at L3-L4    resulting from ligamentum flavum hypertrophy and facet arthrosis, axial    image 59 series 3      This document has been electronically signed by: Annel Everett DO on    04/04/2022 04:54 AM      All CTs at this facility use dose modulation techniques and iterative    reconstructions, and/or weight-based dosing   when appropriate to reduce radiation to a low as reasonably achievable. Felipa Parekh     LABS:   Labs Reviewed - No data to display    EMERGENCY DEPARTMENT COURSE:   Vitals:    Vitals:    04/04/22 hours as needed for Pain    OXYCODONE-ACETAMINOPHEN (PERCOCET) 5-325 MG PER TABLET    Take 1 tablet by mouth every 6 hours as needed for Pain for up to 3 days. Intended supply: 3 days.  Take lowest dose possible to manage pain    PREDNISONE (DELTASONE) 20 MG TABLET    Take 2 tablets by mouth daily for 7 days       (Please note that portions of this note were completed with a voice recognition program.  Efforts were made to edit the dictations but occasionally words are mis-transcribed.)    Nish Abraham, [address] [place] [address],   04/04/22 [id number]

## 2022-04-04 NOTE — ED TRIAGE NOTES
Pt presents to the ED c/o back pain that has been worsening over the last couple days. Pt was found lying on the living room floor per EMS and unable to move without assistance. Pt is alert and oriented, respirations are equal and unlabored.  Pt has hx of back surgeries in the past where rods and screws were placed

## 2022-04-04 NOTE — ED NOTES
Pt state that his pain has improved with pain medication administered.       [facility]  04/04/22 [id number]

## 2022-04-04 NOTE — TELEPHONE ENCOUNTER
Patient lvm stating he was experiencing severe back pain this past weekend and was taken to ER by ambulance.

## 2022-06-22 NOTE — PROGRESS NOTES
"Oncology Rooming Note    June 22, 2022 10:32 AM   Kayy Salazar is a 43 year old female who presents for:    Chief Complaint   Patient presents with     Oncology Clinic Visit     Initial Vitals: /66 (BP Location: Left arm, Patient Position: Sitting)   Pulse 55   Temp 98.9  F (37.2  C) (Oral)   Resp 16   Ht 1.575 m (5' 2\")   Wt 50.8 kg (111 lb 14.4 oz)   LMP 03/11/2021   SpO2 100%   BMI 20.47 kg/m   Estimated body mass index is 20.47 kg/m  as calculated from the following:    Height as of this encounter: 1.575 m (5' 2\").    Weight as of this encounter: 50.8 kg (111 lb 14.4 oz). Body surface area is 1.49 meters squared.  No Pain (0) Comment: Data Unavailable   Patient's last menstrual period was 03/11/2021.  Allergies reviewed: Yes  Medications reviewed: Yes    Medications: Medication refills not needed today.  Pharmacy name entered into [facility]:    [id number] [place] [facility] - [place], [place] - [address] [facility] - [place], [place] - [address], [address]        Anette Longoria LPN              " [facility]  [address] [address]  Dept: [phone]  Dept Fax: [phone]      Patient Name:  Naeem Taylor  Visit Date:  1/26/2022    HPI:     Mr. Tasia Stuart is a 64 y.o. male that presents today at [facility] for evaluation of the following: Referral from the office emergency department for evaluation of multiple spinous process fractures and compression deformity fracture of the lumbar spine evidenced on CT scanning    Chief Complaint   Patient presents with    New Patient     cervical thoracic lumbar fracture     Other     sore back - knees sore - neck very sore         HPI   Mr. Tasia Stuart is a pleasant, active 59-year-old gentleman, a former smoker tobacco with a quit date of 8/12/2018 who denies current alcohol use and has a medical history significant for arthritis, hyper tension and hyperlipidemia, sleep apnea requiring CPAP and a history for back surgery. He presents to our service as a referral from the emergency department [facility] who evaluated and treated him for injury sustained in a mechanical fall which resulted in multiple spinous process fractures most notably at C7 and T1 along with a compression deformity fracture of lumbar 1, all were evidenced on CT scanning which is age-indeterminate. Having ascertained over the phone that the patient was stable and intact absent any significant deficits, instructions were provided to the ED staff to place the patient in a cervical collar with a follow-up with our service scheduled for today to include MRIs of the lumbar thoracic and cervical spine to rule out ligamentous injury and ascertain the number and acuteness of possible vertebral body fractures. He arrives to the clinic today unaccompanied and walking without assistance with a cervical collar intact.   He complains of some reduced range of cervical motion with some tenderness to palpation at the C7-T1 junction consistent with spinous process fractures. He denies any significant weakness numbness upper extremities or lower extremities and demonstrated 5/5 strength for upper extremity groups bilaterally and symmetrically as well as lower extremity groups bilaterally and symmetrically and was intact to pinprick sensation. Tenderness to palpation of the lumbar spine consistent with an area of concern consistent with compression fracture is noted on exam.          Medications:    Current Outpatient Medications:     ibuprofen (ADVIL;MOTRIN) 600 MG tablet, Take 600 mg by mouth every 6 hours as needed for Pain, Disp: , Rfl:     atorvastatin (LIPITOR) 10 MG tablet, Take 1 tablet by mouth nightly, Disp: 90 tablet, Rfl: 3    escitalopram (LEXAPRO) 20 MG tablet, Take 1 tablet by mouth daily, Disp: 90 tablet, Rfl: 3    ezetimibe (ZETIA) 10 MG tablet, Take 1 tablet by mouth nightly, Disp: 90 tablet, Rfl: 3    lisinopril (PRINIVIL;ZESTRIL) 10 MG tablet, Take 1 tablet by mouth every morning, Disp: 90 tablet, Rfl: 3    aspirin (SM ASPIRIN ADULT LOW STRENGTH) 81 MG EC tablet, Take 1 tablet by mouth daily, Disp: 90 tablet, Rfl: 3    etodolac (LODINE) 400 MG tablet, Take 1 tablet by mouth 2 times daily, Disp: 180 tablet, Rfl: 3    sildenafil (VIAGRA) 100 MG tablet, Take 1 tablet by mouth as needed for Erectile Dysfunction, Disp: 30 tablet, Rfl: 3    The patient is allergic to latex and tape [adhesive tape]. Past Medical History  Manda Macdonald  has a past medical history of Arthritis, Hyperlipidemia, Hypertension, and CARINA on CPAP. Past Surgical History  The patient  has a past surgical history that includes back surgery; Carpal tunnel release (Right); Colonoscopy; and ARTHROSCOPY / ARTHROTOMY KNEE (Left, 2/21/2019). Family History  This patient's family history includes Atrial Fibrillation in his sister; Hypertension in his father; Pacemaker in his father.     Social History  Manda Macdonald  reports that he quit smoking about 3 years ago. His smoking use included cigarettes. He has never used smokeless tobacco. He reports that he does not drink alcohol and does not use drugs. Subjective:      Review of Systems    Objective:     BP (!) 140/86 (Site: Right Upper Arm, Position: Sitting, Cuff Size: Large Adult)   Pulse 78   Resp 18   Ht 6' 3\" (1.905 m)   Wt (!) 386 lb (175.1 kg)   SpO2 95%   BMI 48.25 kg/m²          Physical Exam  Constitutional:       Appearance: He is obese. HENT:      Head: Normocephalic. Eyes:      Pupils: Pupils are equal, round, and reactive to light. Cardiovascular:      Rate and Rhythm: Normal rate and regular rhythm. Pulses: Normal pulses. Pulmonary:      Effort: Pulmonary effort is normal. No respiratory distress. Breath sounds: Normal breath sounds. Abdominal:      General: Bowel sounds are normal.   Musculoskeletal:         General: Tenderness present. Normal range of motion. Comments: Tenderness to palpation of the lumbar spine consistent with area of concern on CT scan for compression fracture   Skin:     General: Skin is warm and dry. Neurological:      General: No focal deficit present. Mental Status: He is alert and oriented to person, place, and time. Cranial Nerves: No cranial nerve deficit. Sensory: Sensation is intact. Motor: No weakness. Comments: 5/5 strength for upper and lower extremity groups bilaterally and symmetrically and intact for sensation symmetrically. Psychiatric:         Mood and Affect: Mood normal.         Behavior: Behavior normal.         Thought Content:  Thought content normal.         Judgment: Judgment normal.         Reviewed CT Type:  Film and Report    Lab Results   Component Value Date    WBC 5.5 06/27/2020    HGB 14.8 06/27/2020    HCT 45.2 06/27/2020     06/27/2020    CHOL 159 03/23/2021    TRIG 117 03/23/2021    HDL 55 03/23/2021    ALT 53 (H) 03/23/2021    AST 23 03/23/2021     06/27/2020    K 4.5 06/27/2020     06/27/2020    CREATININE 0.7 06/27/2020    BUN 15 06/27/2020    CO2 23 06/27/2020    TSH 1.180 06/27/2020    PSA 0.51 06/27/2020    GLUF 100 06/27/2020    LABA1C 5.6 06/27/2020       Assessment and Plan      Diagnosis Orders   1. Closed displaced fracture of seventh cervical vertebra, unspecified fracture morphology, initial encounter ([facility] [address]     2. Compression fracture of L1 vertebra, initial encounter [facility])        With age indeterminant imaging in the form of CT scanning we feel it is reasonable to order MRIs to rule out ligamentous injury and to ascertain the number and acuteness of possible fractures for the thoracic lumbar and cervical spine. A return to office follow-up appointment is scheduled in 2 weeks with encouragement for him to maintain the cervical collar until that time. He is encouraged in the interim to contact her office should he develop any significant changes in his general presentation or should have any additional or significant questions or concerns.        Electronically signed by Kindra Jimenez PA-C on 1/26/2022 at 4:06 PM

## 2023-02-10 ENCOUNTER — HOSPITAL ENCOUNTER (OUTPATIENT)
Dept: MRI IMAGING | Age: 58
Discharge: HOME | End: 2023-02-10
Payer: COMMERCIAL

## 2023-02-10 DIAGNOSIS — S33.5XXA SPRAIN OF LIGAMENTS OF LUMBAR SPINE, INITIAL ENCOUNTER: ICD-10-CM

## 2023-02-10 PROCEDURE — A9579 GAD-BASE MR CONTRAST NOS,1ML: HCPCS | Performed by: ORTHOPAEDIC SURGERY

## 2023-02-10 PROCEDURE — 72158 MRI LUMBAR SPINE W/O & W/DYE: CPT

## 2023-02-10 PROCEDURE — 6360000004 HC RX CONTRAST MEDICATION: Performed by: ORTHOPAEDIC SURGERY

## 2023-02-10 RX ADMIN — GADOTERIDOL 20 ML: 279.3 INJECTION, SOLUTION INTRAVENOUS at 11:33

## 2023-02-16 ENCOUNTER — PROCEDURE VISIT (OUTPATIENT)
Dept: NEUROLOGY | Age: 58
End: 2023-02-16
Payer: COMMERCIAL

## 2023-02-16 DIAGNOSIS — S33.5XXA LUMBAR SPRAIN, INITIAL ENCOUNTER: Primary | ICD-10-CM

## 2023-02-16 DIAGNOSIS — S23.3XXA SPRAIN OF LIGAMENTS OF THORACIC SPINE, INITIAL ENCOUNTER: ICD-10-CM

## 2023-02-16 PROCEDURE — 95886 MUSC TEST DONE W/N TEST COMP: CPT | Performed by: PSYCHIATRY & NEUROLOGY

## 2023-02-16 PROCEDURE — 95910 NRV CNDJ TEST 7-8 STUDIES: CPT | Performed by: PSYCHIATRY & NEUROLOGY

## 2023-05-04 ENCOUNTER — HOSPITAL ENCOUNTER (OUTPATIENT)
Age: 58
Discharge: HOME | End: 2023-05-04
Payer: COMMERCIAL

## 2023-05-04 LAB
EKG ATRIAL RATE: 77 BPM
EKG P AXIS: 39 DEGREES
EKG P-R INTERVAL: 174 MS
EKG Q-T INTERVAL: 402 MS
EKG QRS DURATION: 106 MS
EKG QTC CALCULATION (BAZETT): 454 MS
EKG R AXIS: -29 DEGREES
EKG T AXIS: 47 DEGREES
EKG VENTRICULAR RATE: 77 BPM

## 2023-05-04 PROCEDURE — 93010 ELECTROCARDIOGRAM REPORT: CPT | Performed by: NUCLEAR MEDICINE

## 2023-05-04 PROCEDURE — 93005 ELECTROCARDIOGRAM TRACING: CPT | Performed by: ORTHOPAEDIC SURGERY

## 2024-05-21 ENCOUNTER — HOSPITAL ENCOUNTER (OUTPATIENT)
Dept: MRI IMAGING | Age: 59
Discharge: HOME | End: 2024-05-21
Payer: COMMERCIAL

## 2024-05-21 DIAGNOSIS — R52 PAIN: ICD-10-CM

## 2024-05-21 PROCEDURE — 72148 MRI LUMBAR SPINE W/O DYE: CPT

## 2025-03-25 ENCOUNTER — HOSPITAL ENCOUNTER (EMERGENCY)
Age: 60
Discharge: HOME | End: 2025-03-26
Attending: EMERGENCY MEDICINE
Payer: COMMERCIAL

## 2025-03-25 DIAGNOSIS — M54.50 ACUTE BILATERAL LOW BACK PAIN WITHOUT SCIATICA: Primary | ICD-10-CM

## 2025-03-25 DIAGNOSIS — S32.009A CLOSED FRACTURE OF TRANSVERSE PROCESS OF LUMBAR VERTEBRA, INITIAL ENCOUNTER (HCC): ICD-10-CM

## 2025-03-25 LAB
BASOPHILS ABSOLUTE: 0 THOU/MM3 (ref 0–0.1)
BASOPHILS NFR BLD AUTO: 0.6 %
DEPRECATED RDW RBC AUTO: 47.6 FL (ref 35–45)
EOSINOPHIL NFR BLD AUTO: 2.4 %
EOSINOPHILS ABSOLUTE: 0.1 THOU/MM3 (ref 0–0.4)
ERYTHROCYTE [DISTWIDTH] IN BLOOD BY AUTOMATED COUNT: 13.9 % (ref 11.5–14.5)
HCT VFR BLD AUTO: 32 % (ref 42–52)
HGB BLD-MCNC: 10.7 GM/DL (ref 14–18)
IMM GRANULOCYTES # BLD AUTO: 0.04 THOU/MM3 (ref 0–0.07)
IMM GRANULOCYTES NFR BLD AUTO: 0.6 %
LYMPHOCYTES ABSOLUTE: 1.1 THOU/MM3 (ref 1–4.8)
LYMPHOCYTES NFR BLD AUTO: 18.4 %
MCH RBC QN AUTO: 31.1 PG (ref 26–33)
MCHC RBC AUTO-ENTMCNC: 33.4 GM/DL (ref 32.2–35.5)
MCV RBC AUTO: 93 FL (ref 80–94)
MONOCYTES ABSOLUTE: 0.7 THOU/MM3 (ref 0.4–1.3)
MONOCYTES NFR BLD AUTO: 10.5 %
NEUTROPHILS ABSOLUTE: 4.2 THOU/MM3 (ref 1.8–7.7)
NEUTROPHILS NFR BLD AUTO: 67.5 %
NRBC BLD AUTO-RTO: 0 /100 WBC
PLATELET # BLD AUTO: 187 THOU/MM3 (ref 130–400)
PMV BLD AUTO: 9.6 FL (ref 9.4–12.4)
RBC # BLD AUTO: 3.44 MILL/MM3 (ref 4.7–6.1)
WBC # BLD AUTO: 6.2 THOU/MM3 (ref 4.8–10.8)

## 2025-03-25 PROCEDURE — 99284 EMERGENCY DEPT VISIT MOD MDM: CPT

## 2025-03-25 PROCEDURE — 80053 COMPREHEN METABOLIC PANEL: CPT

## 2025-03-25 PROCEDURE — 85025 COMPLETE CBC W/AUTO DIFF WBC: CPT

## 2025-03-25 PROCEDURE — 36415 COLL VENOUS BLD VENIPUNCTURE: CPT

## 2025-03-25 ASSESSMENT — PAIN SCALES - GENERAL: PAINLEVEL_OUTOF10: 6

## 2025-03-25 ASSESSMENT — PAIN - FUNCTIONAL ASSESSMENT: PAIN_FUNCTIONAL_ASSESSMENT: 0-10

## 2025-03-26 ENCOUNTER — APPOINTMENT (OUTPATIENT)
Dept: CT IMAGING | Age: 60
End: 2025-03-26
Payer: COMMERCIAL

## 2025-03-26 VITALS
WEIGHT: 315 LBS | DIASTOLIC BLOOD PRESSURE: 57 MMHG | SYSTOLIC BLOOD PRESSURE: 109 MMHG | HEART RATE: 69 BPM | TEMPERATURE: 98.3 F | BODY MASS INDEX: 40.43 KG/M2 | HEIGHT: 74 IN | OXYGEN SATURATION: 97 % | RESPIRATION RATE: 16 BRPM

## 2025-03-26 LAB
ALBUMIN SERPL BCG-MCNC: 3.5 G/DL (ref 3.4–4.9)
ALP SERPL-CCNC: 69 U/L (ref 40–129)
ALT SERPL W/O P-5'-P-CCNC: 55 U/L (ref 10–50)
ANION GAP SERPL CALC-SCNC: 11 MEQ/L (ref 8–16)
AST SERPL-CCNC: 53 U/L (ref 10–50)
BILIRUB SERPL-MCNC: 0.8 MG/DL (ref 0.3–1.2)
BUN SERPL-MCNC: 13 MG/DL (ref 8–23)
CALCIUM SERPL-MCNC: 9.1 MG/DL (ref 8.8–10.2)
CHLORIDE SERPL-SCNC: 98 MEQ/L (ref 98–111)
CO2 SERPL-SCNC: 26 MEQ/L (ref 22–29)
CREAT SERPL-MCNC: 0.8 MG/DL (ref 0.7–1.2)
GFR SERPL CREATININE-BSD FRML MDRD: > 90 ML/MIN/1.73M2
GLUCOSE SERPL-MCNC: 143 MG/DL (ref 74–109)
OSMOLALITY SERPL CALC.SUM OF ELEC: 272.7 MOSMOL/KG (ref 275–300)
POTASSIUM SERPL-SCNC: 3.9 MEQ/L (ref 3.5–5.2)
PROT SERPL-MCNC: 6.6 G/DL (ref 6.4–8.3)
SODIUM SERPL-SCNC: 135 MEQ/L (ref 135–145)

## 2025-03-26 PROCEDURE — 6360000002 HC RX W HCPCS: Performed by: EMERGENCY MEDICINE

## 2025-03-26 PROCEDURE — 6370000000 HC RX 637 (ALT 250 FOR IP): Performed by: EMERGENCY MEDICINE

## 2025-03-26 PROCEDURE — 96374 THER/PROPH/DIAG INJ IV PUSH: CPT

## 2025-03-26 PROCEDURE — 72131 CT LUMBAR SPINE W/O DYE: CPT

## 2025-03-26 PROCEDURE — 96375 TX/PRO/DX INJ NEW DRUG ADDON: CPT

## 2025-03-26 RX ORDER — KETOROLAC TROMETHAMINE 30 MG/ML
30 INJECTION, SOLUTION INTRAMUSCULAR; INTRAVENOUS ONCE
Status: COMPLETED | OUTPATIENT
Start: 2025-03-26 | End: 2025-03-26

## 2025-03-26 RX ADMIN — HYDROMORPHONE HYDROCHLORIDE 0.5 MG: 1 INJECTION, SOLUTION INTRAMUSCULAR; INTRAVENOUS; SUBCUTANEOUS at 00:57

## 2025-03-26 RX ADMIN — KETOROLAC TROMETHAMINE 30 MG: 30 INJECTION, SOLUTION INTRAMUSCULAR at 00:57

## 2025-03-26 RX ADMIN — MAJOR MAGNESIUM CITRATE ORAL SOLUTION - LEMON 296 ML: 1.75 LIQUID ORAL at 04:07

## 2025-03-26 ASSESSMENT — ENCOUNTER SYMPTOMS
SORE THROAT: 0
ABDOMINAL PAIN: 0
COUGH: 0
BACK PAIN: 1
RHINORRHEA: 0
SHORTNESS OF BREATH: 0
EYE PAIN: 0

## 2025-03-26 ASSESSMENT — PAIN - FUNCTIONAL ASSESSMENT
PAIN_FUNCTIONAL_ASSESSMENT: 0-10

## 2025-03-26 ASSESSMENT — PAIN SCALES - GENERAL
PAINLEVEL_OUTOF10: 7
PAINLEVEL_OUTOF10: 3
PAINLEVEL_OUTOF10: 7
PAINLEVEL_OUTOF10: 4
PAINLEVEL_OUTOF10: 4

## 2025-03-26 NOTE — ED TRIAGE NOTES
Pt presents to ED from home via EMS with complaints of back pain and a post op problem. Pt states he had back surgery in [place] last week and was discharged Friday. Pt states he has been having back spasms all day. Pt reports taking flexeril and norco. Pt is A&Ox4, vital signs assessed.

## 2025-03-26 NOTE — ED PROVIDER NOTES
[facility] EMERGENCY DEPARTMENT  EMERGENCY DEPARTMENT ENCOUNTER      Pt Name: Rodriog Espinoza  MRN: [id number]  Birthdate 1965  Date of evaluation: 3/25/2025  Provider: Misha Mayfield DO  3:56 AM    CHIEF COMPLAINT       Chief Complaint   Patient presents with    Post-op Problem    Back Pain         HISTORY OF PRESENT ILLNESS    Rodrigo Espinoza is a 59 y.o. male who presents to the emergency department with a CC of back pain and spasm s/p lumbar fusion in [facility] on Friday.  Patient states he was discharged Saturday and has been having a lot of pain at home getting around.  Today the spasms became worse.  No fever, chills, patient states intermittent cold feeling in left leg.       HPI    Nursing Notes were reviewed.    REVIEW OF SYSTEMS       Review of Systems   Constitutional:  Negative for chills and fever.   HENT:  Negative for rhinorrhea and sore throat.    Eyes:  Negative for pain and visual disturbance.   Respiratory:  Negative for cough and shortness of breath.    Cardiovascular:  Negative for chest pain.   Gastrointestinal:  Negative for abdominal pain.   Endocrine: Negative for polydipsia and polyuria.   Genitourinary:  Negative for dysuria and flank pain.   Musculoskeletal:  Positive for back pain. Negative for arthralgias and myalgias.   Skin:  Negative for rash and wound.   Neurological:  Negative for dizziness, weakness and light-headedness.   Psychiatric/Behavioral:  Negative for suicidal ideas.        Except as noted above the remainder of the review of systems was reviewed and negative.       PAST MEDICAL HISTORY     Past Medical History:   Diagnosis Date    Arthritis     OLU. THUMBS    Hyperlipidemia     Hypertension     CARINA on CPAP          SURGICAL HISTORY       Past Surgical History:   Procedure Laterality Date    ARTHROSCOPY / ARTHROTOMY KNEE Left 2/21/2019    LEFT KNEE ARTHROSCOPY WITH PARTIAL MENISCECTOMY performed by Angel Trejo MD at [facility] OR    BACK SURGERY      X3 - l4-5

## 2025-05-27 PROBLEM — E66.9 OBESITY, UNSPECIFIED: Status: ACTIVE | Noted: 2025-05-27

## 2025-08-05 ENCOUNTER — HOSPITAL ENCOUNTER (OUTPATIENT)
Age: 60
Discharge: HOME | End: 2025-08-07
Payer: MEDICARE

## 2025-08-05 ENCOUNTER — HOSPITAL ENCOUNTER (OUTPATIENT)
Dept: NUCLEAR MEDICINE | Age: 60
Discharge: HOME | End: 2025-08-05
Payer: MEDICARE

## 2025-08-05 VITALS — HEIGHT: 73 IN | WEIGHT: 315 LBS | BODY MASS INDEX: 41.75 KG/M2

## 2025-08-05 DIAGNOSIS — R06.02 SHORTNESS OF BREATH: ICD-10-CM

## 2025-08-05 LAB
ECHO BSA: 2.9 M2
NUC STRESS EJECTION FRACTION: 47 %
STRESS BASELINE DIAS BP: 67 MMHG
STRESS BASELINE HR: 54 BPM
STRESS BASELINE SYS BP: 112 MMHG
STRESS ESTIMATED WORKLOAD: 1 METS
STRESS PEAK DIAS BP: 66 MMHG
STRESS PEAK SYS BP: 125 MMHG
STRESS PERCENT HR ACHIEVED: 47 %
STRESS POST PEAK HR: 75 BPM
STRESS RATE PRESSURE PRODUCT: 9375 BPM*MMHG
STRESS STAGE 1 BP: NORMAL MMHG
STRESS STAGE 1 DURATION: 1 MIN:SEC
STRESS STAGE 1 HR: 61 BPM
STRESS STAGE 2 BP: NORMAL MMHG
STRESS STAGE 2 DURATION: 1 MIN:SEC
STRESS STAGE 2 HR: 75 BPM
STRESS STAGE 3 BP: NORMAL MMHG
STRESS STAGE 3 DURATION: 1 MIN:SEC
STRESS STAGE 3 HR: 72 BPM
STRESS STAGE RECOVERY 1 BP: NORMAL MMHG
STRESS STAGE RECOVERY 1 DURATION: 1 MIN:SEC
STRESS STAGE RECOVERY 1 HR: 72 BPM
STRESS STAGE RECOVERY 2 BP: NORMAL MMHG
STRESS STAGE RECOVERY 2 DURATION: 1 MIN:SEC
STRESS STAGE RECOVERY 2 HR: 69 BPM
STRESS STAGE RECOVERY 3 BP: NORMAL MMHG
STRESS STAGE RECOVERY 3 DURATION: 1 MIN:SEC
STRESS STAGE RECOVERY 3 HR: 72 BPM
STRESS STAGE RECOVERY 4 BP: NORMAL MMHG
STRESS STAGE RECOVERY 4 DURATION: 1 MIN:SEC
STRESS STAGE RECOVERY 4 HR: 71 BPM
STRESS TARGET HR: 161 BPM
TID: 1.11

## 2025-08-05 PROCEDURE — 3430000000 HC RX DIAGNOSTIC RADIOPHARMACEUTICAL: Performed by: INTERNAL MEDICINE

## 2025-08-05 PROCEDURE — 78452 HT MUSCLE IMAGE SPECT MULT: CPT

## 2025-08-05 PROCEDURE — 93016 CV STRESS TEST SUPVJ ONLY: CPT | Performed by: INTERNAL MEDICINE

## 2025-08-05 PROCEDURE — 93018 CV STRESS TEST I&R ONLY: CPT | Performed by: INTERNAL MEDICINE

## 2025-08-05 PROCEDURE — 6360000002 HC RX W HCPCS: Performed by: FAMILY MEDICINE

## 2025-08-05 PROCEDURE — A9500 TC99M SESTAMIBI: HCPCS | Performed by: INTERNAL MEDICINE

## 2025-08-05 PROCEDURE — 93017 CV STRESS TEST TRACING ONLY: CPT

## 2025-08-05 PROCEDURE — 78452 HT MUSCLE IMAGE SPECT MULT: CPT | Performed by: INTERNAL MEDICINE

## 2025-08-05 RX ORDER — TETRAKIS(2-METHOXYISOBUTYLISOCYANIDE)COPPER(I) TETRAFLUOROBORATE 1 MG/ML
10 INJECTION, POWDER, LYOPHILIZED, FOR SOLUTION INTRAVENOUS
Status: COMPLETED | OUTPATIENT
Start: 2025-08-05 | End: 2025-08-05

## 2025-08-05 RX ORDER — TETRAKIS(2-METHOXYISOBUTYLISOCYANIDE)COPPER(I) TETRAFLUOROBORATE 1 MG/ML
33.1 INJECTION, POWDER, LYOPHILIZED, FOR SOLUTION INTRAVENOUS
Status: COMPLETED | OUTPATIENT
Start: 2025-08-05 | End: 2025-08-05

## 2025-08-05 RX ORDER — REGADENOSON 0.08 MG/ML
0.4 INJECTION, SOLUTION INTRAVENOUS
Status: COMPLETED | OUTPATIENT
Start: 2025-08-05 | End: 2025-08-05

## 2025-08-05 RX ADMIN — REGADENOSON 0.4 MG: 0.08 INJECTION, SOLUTION INTRAVENOUS at 09:13

## 2025-08-05 RX ADMIN — Medication 33.1 MILLICURIE: at 09:11

## 2025-08-05 RX ADMIN — Medication 10 MILLICURIE: at 08:00

## 2025-08-14 ENCOUNTER — TELEPHONE (OUTPATIENT)
Dept: CARDIOLOGY CLINIC | Age: 60
End: 2025-08-14

## 2025-08-14 ENCOUNTER — OFFICE VISIT (OUTPATIENT)
Dept: CARDIOLOGY CLINIC | Age: 60
End: 2025-08-14
Payer: MEDICARE

## 2025-08-14 VITALS
SYSTOLIC BLOOD PRESSURE: 138 MMHG | WEIGHT: 315 LBS | DIASTOLIC BLOOD PRESSURE: 84 MMHG | BODY MASS INDEX: 41.75 KG/M2 | HEART RATE: 66 BPM | HEIGHT: 73 IN

## 2025-08-14 DIAGNOSIS — R06.02 SOB (SHORTNESS OF BREATH): ICD-10-CM

## 2025-08-14 DIAGNOSIS — G47.33 OSA (OBSTRUCTIVE SLEEP APNEA): ICD-10-CM

## 2025-08-14 DIAGNOSIS — R06.02 SHORTNESS OF BREATH: ICD-10-CM

## 2025-08-14 DIAGNOSIS — I10 ESSENTIAL HYPERTENSION: ICD-10-CM

## 2025-08-14 DIAGNOSIS — E78.5 HYPERLIPIDEMIA, UNSPECIFIED HYPERLIPIDEMIA TYPE: ICD-10-CM

## 2025-08-14 DIAGNOSIS — E66.01 OBESITY, MORBID, BMI 50 OR HIGHER (HCC): ICD-10-CM

## 2025-08-14 DIAGNOSIS — R94.39 POSITIVE CARDIAC STRESS TEST: Primary | ICD-10-CM

## 2025-08-14 DIAGNOSIS — E78.1 HYPERTRIGLYCERIDEMIA: ICD-10-CM

## 2025-08-14 DIAGNOSIS — R06.09 DOE (DYSPNEA ON EXERTION): ICD-10-CM

## 2025-08-14 PROCEDURE — 99205 OFFICE O/P NEW HI 60 MIN: CPT | Performed by: INTERNAL MEDICINE

## 2025-08-14 PROCEDURE — 3075F SYST BP GE 130 - 139MM HG: CPT | Performed by: INTERNAL MEDICINE

## 2025-08-14 PROCEDURE — 3079F DIAST BP 80-89 MM HG: CPT | Performed by: INTERNAL MEDICINE

## 2025-08-14 PROCEDURE — 93000 ELECTROCARDIOGRAM COMPLETE: CPT | Performed by: INTERNAL MEDICINE

## 2025-08-14 RX ORDER — ATORVASTATIN CALCIUM 40 MG/1
40 TABLET, FILM COATED ORAL NIGHTLY
Qty: 90 TABLET | Refills: 3 | Status: SHIPPED | OUTPATIENT
Start: 2025-08-14

## 2025-08-22 ENCOUNTER — HOSPITAL ENCOUNTER (OUTPATIENT)
Age: 60
Setting detail: OUTPATIENT SURGERY
Discharge: HOME | End: 2025-08-22
Attending: INTERNAL MEDICINE | Admitting: INTERNAL MEDICINE
Payer: MEDICARE

## 2025-08-22 VITALS
BODY MASS INDEX: 41.75 KG/M2 | RESPIRATION RATE: 14 BRPM | HEIGHT: 73 IN | DIASTOLIC BLOOD PRESSURE: 70 MMHG | SYSTOLIC BLOOD PRESSURE: 130 MMHG | TEMPERATURE: 98.1 F | HEART RATE: 54 BPM | OXYGEN SATURATION: 95 % | WEIGHT: 315 LBS

## 2025-08-22 DIAGNOSIS — R94.39 POSITIVE CARDIAC STRESS TEST: ICD-10-CM

## 2025-08-22 LAB
ABO GROUP BLD: NORMAL
ANION GAP SERPL CALC-SCNC: 11 MEQ/L (ref 8–16)
BUN SERPL-MCNC: 21 MG/DL (ref 8–23)
CALCIUM SERPL-MCNC: 9.5 MG/DL (ref 8.5–10.5)
CHLORIDE SERPL-SCNC: 105 MEQ/L (ref 98–111)
CHOLEST SERPL-MCNC: 117 MG/DL (ref 100–199)
CO2 SERPL-SCNC: 25 MEQ/L (ref 22–29)
CREAT SERPL-MCNC: 1 MG/DL (ref 0.7–1.2)
DEPRECATED RDW RBC AUTO: 52 FL (ref 35–45)
ECHO BSA: 2.99 M2
EKG ATRIAL RATE: 68 BPM
EKG P AXIS: 43 DEGREES
EKG P-R INTERVAL: 188 MS
EKG Q-T INTERVAL: 412 MS
EKG QRS DURATION: 100 MS
EKG QTC CALCULATION (BAZETT): 438 MS
EKG R AXIS: -17 DEGREES
EKG T AXIS: 38 DEGREES
EKG VENTRICULAR RATE: 68 BPM
ERYTHROCYTE [DISTWIDTH] IN BLOOD BY AUTOMATED COUNT: 15.4 % (ref 11.5–14.5)
GFR SERPL CREATININE-BSD FRML MDRD: 86 ML/MIN/1.73M2
GLUCOSE SERPL-MCNC: 101 MG/DL (ref 74–109)
HCT VFR BLD AUTO: 39.1 % (ref 42–52)
HDLC SERPL-MCNC: 50 MG/DL
HGB BLD-MCNC: 12.8 GM/DL (ref 14–18)
IAT IGG-SP REAG SERPL QL: NORMAL
INR PPP: 1.01 (ref 0.85–1.13)
LDLC SERPL CALC-MCNC: 38 MG/DL
MCH RBC QN AUTO: 30.3 PG (ref 26–33)
MCHC RBC AUTO-ENTMCNC: 32.7 GM/DL (ref 32.2–35.5)
MCV RBC AUTO: 92.7 FL (ref 80–94)
PLATELET # BLD AUTO: 155 THOU/MM3 (ref 130–400)
PMV BLD AUTO: 9.6 FL (ref 9.4–12.4)
POTASSIUM SERPL-SCNC: 4.9 MEQ/L (ref 3.5–5.2)
PROTHROMBIN TIME: 11.8 SECONDS (ref 10–13.5)
RBC # BLD AUTO: 4.22 MILL/MM3 (ref 4.7–6.1)
RH BLD: NORMAL
SODIUM SERPL-SCNC: 141 MEQ/L (ref 135–145)
TRIGL SERPL-MCNC: 144 MG/DL (ref 0–199)
WBC # BLD AUTO: 5.3 THOU/MM3 (ref 4.8–10.8)

## 2025-08-22 PROCEDURE — 86901 BLOOD TYPING SEROLOGIC RH(D): CPT

## 2025-08-22 PROCEDURE — 36415 COLL VENOUS BLD VENIPUNCTURE: CPT

## 2025-08-22 PROCEDURE — 99153 MOD SED SAME PHYS/QHP EA: CPT | Performed by: INTERNAL MEDICINE

## 2025-08-22 PROCEDURE — 93005 ELECTROCARDIOGRAM TRACING: CPT | Performed by: INTERNAL MEDICINE

## 2025-08-22 PROCEDURE — 7100000011 HC PHASE II RECOVERY - ADDTL 15 MIN: Performed by: INTERNAL MEDICINE

## 2025-08-22 PROCEDURE — 86900 BLOOD TYPING SEROLOGIC ABO: CPT

## 2025-08-22 PROCEDURE — 99152 MOD SED SAME PHYS/QHP 5/>YRS: CPT | Performed by: INTERNAL MEDICINE

## 2025-08-22 PROCEDURE — 85610 PROTHROMBIN TIME: CPT

## 2025-08-22 PROCEDURE — 85027 COMPLETE CBC AUTOMATED: CPT

## 2025-08-22 PROCEDURE — 7100000010 HC PHASE II RECOVERY - FIRST 15 MIN: Performed by: INTERNAL MEDICINE

## 2025-08-22 PROCEDURE — 6360000002 HC RX W HCPCS: Performed by: INTERNAL MEDICINE

## 2025-08-22 PROCEDURE — 86885 COOMBS TEST INDIRECT QUAL: CPT

## 2025-08-22 PROCEDURE — 80061 LIPID PANEL: CPT

## 2025-08-22 PROCEDURE — 6360000004 HC RX CONTRAST MEDICATION: Performed by: INTERNAL MEDICINE

## 2025-08-22 PROCEDURE — 80048 BASIC METABOLIC PNL TOTAL CA: CPT

## 2025-08-22 PROCEDURE — 93458 L HRT ARTERY/VENTRICLE ANGIO: CPT | Performed by: INTERNAL MEDICINE

## 2025-08-22 PROCEDURE — 2580000003 HC RX 258: Performed by: INTERNAL MEDICINE

## 2025-08-22 PROCEDURE — C1894 INTRO/SHEATH, NON-LASER: HCPCS | Performed by: INTERNAL MEDICINE

## 2025-08-22 PROCEDURE — 2709999900 HC NON-CHARGEABLE SUPPLY: Performed by: INTERNAL MEDICINE

## 2025-08-22 PROCEDURE — C1769 GUIDE WIRE: HCPCS | Performed by: INTERNAL MEDICINE

## 2025-08-22 RX ORDER — HEPARIN SODIUM 1000 [USP'U]/ML
INJECTION, SOLUTION INTRAVENOUS; SUBCUTANEOUS PRN
Status: DISCONTINUED | OUTPATIENT
Start: 2025-08-22 | End: 2025-08-22 | Stop reason: HOSPADM

## 2025-08-22 RX ORDER — SODIUM CHLORIDE 0.9 % (FLUSH) 0.9 %
5-40 SYRINGE (ML) INJECTION PRN
Status: DISCONTINUED | OUTPATIENT
Start: 2025-08-22 | End: 2025-08-22 | Stop reason: HOSPADM

## 2025-08-22 RX ORDER — SODIUM CHLORIDE 9 MG/ML
INJECTION, SOLUTION INTRAVENOUS CONTINUOUS
Status: DISCONTINUED | OUTPATIENT
Start: 2025-08-22 | End: 2025-08-22 | Stop reason: HOSPADM

## 2025-08-22 RX ORDER — FENTANYL CITRATE 50 UG/ML
INJECTION, SOLUTION INTRAMUSCULAR; INTRAVENOUS PRN
Status: DISCONTINUED | OUTPATIENT
Start: 2025-08-22 | End: 2025-08-22 | Stop reason: HOSPADM

## 2025-08-22 RX ORDER — ATROPINE SULFATE 0.4 MG/ML
0.5 INJECTION, SOLUTION INTRAVENOUS
Status: DISCONTINUED | OUTPATIENT
Start: 2025-08-22 | End: 2025-08-22 | Stop reason: HOSPADM

## 2025-08-22 RX ORDER — BUMETANIDE 1 MG/1
1 TABLET ORAL DAILY
Qty: 90 TABLET | Refills: 2 | Status: SHIPPED | OUTPATIENT
Start: 2025-08-22

## 2025-08-22 RX ORDER — LIDOCAINE HYDROCHLORIDE 20 MG/ML
INJECTION, SOLUTION EPIDURAL; INFILTRATION; INTRACAUDAL; PERINEURAL PRN
Status: DISCONTINUED | OUTPATIENT
Start: 2025-08-22 | End: 2025-08-22 | Stop reason: HOSPADM

## 2025-08-22 RX ORDER — SODIUM CHLORIDE 9 MG/ML
INJECTION, SOLUTION INTRAVENOUS PRN
Status: DISCONTINUED | OUTPATIENT
Start: 2025-08-22 | End: 2025-08-22 | Stop reason: HOSPADM

## 2025-08-22 RX ORDER — ACETAMINOPHEN 325 MG/1
650 TABLET ORAL EVERY 4 HOURS PRN
Status: DISCONTINUED | OUTPATIENT
Start: 2025-08-22 | End: 2025-08-22 | Stop reason: HOSPADM

## 2025-08-22 RX ORDER — ASPIRIN 325 MG
325 TABLET ORAL ONCE
Status: DISCONTINUED | OUTPATIENT
Start: 2025-08-22 | End: 2025-08-22 | Stop reason: HOSPADM

## 2025-08-22 RX ORDER — CETIRIZINE HYDROCHLORIDE 10 MG/1
10 TABLET ORAL DAILY
COMMUNITY

## 2025-08-22 RX ORDER — IOPAMIDOL 755 MG/ML
INJECTION, SOLUTION INTRAVASCULAR PRN
Status: DISCONTINUED | OUTPATIENT
Start: 2025-08-22 | End: 2025-08-22 | Stop reason: HOSPADM

## 2025-08-22 RX ORDER — SODIUM CHLORIDE 0.9 % (FLUSH) 0.9 %
5-40 SYRINGE (ML) INJECTION EVERY 12 HOURS SCHEDULED
Status: DISCONTINUED | OUTPATIENT
Start: 2025-08-22 | End: 2025-08-22 | Stop reason: HOSPADM

## 2025-08-22 RX ORDER — MIDAZOLAM HYDROCHLORIDE 1 MG/ML
INJECTION, SOLUTION INTRAMUSCULAR; INTRAVENOUS PRN
Status: DISCONTINUED | OUTPATIENT
Start: 2025-08-22 | End: 2025-08-22 | Stop reason: HOSPADM

## 2025-08-22 RX ADMIN — SODIUM CHLORIDE: 0.9 INJECTION, SOLUTION INTRAVENOUS at 07:19

## 2025-08-22 ASSESSMENT — PAIN SCALES - GENERAL
PAINLEVEL_OUTOF10: 0

## (undated) DEVICE — [TOMCAT CUTTER, ARTHROSCOPIC SHAVER BLADE,  DO NOT RESTERILIZE,  DO NOT USE IF PACKAGE IS DAMAGED,  KEEP DRY,  KEEP AWAY FROM SUNLIGHT]: Brand: FORMULA

## (undated) DEVICE — KNEE ARTHROSCOPY V-LF: Brand: MEDLINE INDUSTRIES, INC.

## (undated) DEVICE — PADDING CAST W6INXL4YD COT LO LINTING WYTEX

## (undated) DEVICE — [ARTHROSCOPY PUMP,  DO NOT USE IF PACKAGE IS DAMAGED,  KEEP DRY,  KEEP AWAY FROM SUNLIGHT,  PROTECT FROM HEAT AND RADIOACTIVE SOURCES.]: Brand: FLOSTEADY

## (undated) DEVICE — CHLORAPREP 26ML ORANGE

## (undated) DEVICE — GUIDEWIRE VASC L260CM DIA0.035IN TAPR L4.5CM FLPY TIP

## (undated) DEVICE — SOLUTION IV 1000ML LAC RINGERS PH 6.5 INJ USP VIAFLX PLAS

## (undated) DEVICE — 3M™ COBAN™ NL STERILE NON-LATEX SELF-ADHERENT WRAP, 2084S, 4 IN X 5 YD (10 CM X 4,5 M), 18 ROLLS/CASE: Brand: 3M™ COBAN™

## (undated) DEVICE — SHEATH INTRO 6FR L10CM DIL 0.021IN PLAS SHT ANG GWIRE L45CM

## (undated) DEVICE — GUIDEWIRE VASC L150CM DIA0.035IN STIFF HYDRPHLC ANG TIP

## (undated) DEVICE — DRAPE SURG NEO W43.5XL60IN ABSRB REINF W18XL20IN FEN

## (undated) DEVICE — 3M™ WARMING BLANKET, UPPER BODY, 10 PER CASE, 42268: Brand: BAIR HUGGER™

## (undated) DEVICE — GLOVE ORANGE PI 8   MSG9080

## (undated) DEVICE — CATHETER COR DIAG PIGTAILS PIG 145 CRV 5FR 110CM 6 SIDE H

## (undated) DEVICE — GLOVE SURG SZ 65 THK91MIL LTX FREE SYN POLYISOPRENE

## (undated) DEVICE — CATHETER DIAG 5FR L100CM LUMN ID0.047IN JL3.5 CRV 0 SIDE H

## (undated) DEVICE — TETRA-FLEX CF WOVEN LATEX FREE ELASTIC BANDAGE 6" X 5.5 YD: Brand: TETRA-FLEX™CF

## (undated) DEVICE — KIT ANGIO W/ AT P65 PREM HND CTRL FOR CNTRST DEL ANGIOTOUCH

## (undated) DEVICE — Device

## (undated) DEVICE — COVER ARMBRD W13XL28.5IN IMPERV BLU FOR OP RM

## (undated) DEVICE — CATHETER DIAG 5FR L100CM LUMN ID0.047IN JR4 CRV 0 SIDE H

## (undated) DEVICE — BAND COMPR L29CM XLN CLR PLAS HEMSTAT EXT HK AND LOOP RETEN